# Patient Record
Sex: FEMALE | Race: BLACK OR AFRICAN AMERICAN | NOT HISPANIC OR LATINO | Employment: FULL TIME | URBAN - METROPOLITAN AREA
[De-identification: names, ages, dates, MRNs, and addresses within clinical notes are randomized per-mention and may not be internally consistent; named-entity substitution may affect disease eponyms.]

---

## 2018-06-05 ENCOUNTER — TELEPHONE (OUTPATIENT)
Dept: OBSTETRICS AND GYNECOLOGY | Facility: CLINIC | Age: 28
End: 2018-06-05

## 2018-06-05 NOTE — TELEPHONE ENCOUNTER
Returned the pt's call to the clinic. Informed the pt that Dr. Arboleda isn't currently taking any new ob's and that a message will be sent to Dr. Arboleda to inquire if she can see the pt for her pregnancy. Pt informed that she will be contacted once Dr. Arboleda responds. Pt verbalized understanding.      Can you see this patient for her new pregnancy?

## 2018-06-05 NOTE — TELEPHONE ENCOUNTER
----- Message from Anita Bautista sent at 6/5/2018 11:57 AM CDT -----  Contact: self   Pt is needing a new pregnancy appt. She does have an appt on 7/12/18 for a WWE. The pt is trying to see if she can be seen sooner she is having some cramping. She is about 5wks LMP: April 29. PPT:May 31.

## 2018-06-07 NOTE — TELEPHONE ENCOUNTER
Please let her know I am not accepting OB patients.  Please set up an appointment with Dr. Pantoja or Dr. Corrales

## 2018-06-08 ENCOUNTER — TELEPHONE (OUTPATIENT)
Dept: OBSTETRICS AND GYNECOLOGY | Facility: CLINIC | Age: 28
End: 2018-06-08

## 2018-06-08 DIAGNOSIS — N91.2 AMENORRHEA: Primary | ICD-10-CM

## 2018-06-08 NOTE — TELEPHONE ENCOUNTER
Pt called back, scheduled her with [facility] for Friday for an ectopic pregnancy. Pt verbalized understanding. Scheduled her with this provider because she wanted an  doctor and Dr. Arboleda is not taking new obs.

## 2018-06-12 ENCOUNTER — TELEPHONE (OUTPATIENT)
Dept: OBSTETRICS AND GYNECOLOGY | Facility: CLINIC | Age: 28
End: 2018-06-12

## 2018-06-12 ENCOUNTER — PATIENT MESSAGE (OUTPATIENT)
Dept: OBSTETRICS AND GYNECOLOGY | Facility: CLINIC | Age: 28
End: 2018-06-12

## 2018-06-12 NOTE — TELEPHONE ENCOUNTER
----- Message from Kelly Oneil sent at 6/12/2018  1:31 PM CDT -----  Contact: partner            Name of Who is Calling: STEFFANY HARRY [[id number]]      What is the request in detail: Grace returning call... Please advise      Can the clinic reply by [facility]: no      What Number to Call Back [facility] not [facility]: [phone]

## 2018-06-12 NOTE — TELEPHONE ENCOUNTER
Called patient after seeing reason for visit on Friday's schedule. No answer on patient's phone, left message for patient to contact the office to schedule appointment today. Unable to reach either of the emergency contacts, message left on VM as well.   If patient calls back, please schedule appointment for today.

## 2018-06-13 ENCOUNTER — ROUTINE PRENATAL (OUTPATIENT)
Dept: OBSTETRICS AND GYNECOLOGY | Facility: CLINIC | Age: 28
End: 2018-06-13
Payer: COMMERCIAL

## 2018-06-13 ENCOUNTER — HOSPITAL ENCOUNTER (OUTPATIENT)
Dept: RADIOLOGY | Facility: OTHER | Age: 28
Discharge: HOME | End: 2018-06-13
Attending: OBSTETRICS & GYNECOLOGY
Payer: COMMERCIAL

## 2018-06-13 DIAGNOSIS — N91.2 AMENORRHEA: Primary | ICD-10-CM

## 2018-06-13 DIAGNOSIS — N91.2 AMENORRHEA: ICD-10-CM

## 2018-06-13 LAB
B-HCG UR QL: POSITIVE
CANDIDA RRNA VAG QL PROBE: NEGATIVE
CTP QC/QA: YES
G VAGINALIS RRNA GENITAL QL PROBE: NEGATIVE
T VAGINALIS RRNA GENITAL QL PROBE: NEGATIVE

## 2018-06-13 PROCEDURE — 76801 OB US < 14 WKS SINGLE FETUS: CPT | Mod: 26,,, | Performed by: INTERNAL MEDICINE

## 2018-06-13 PROCEDURE — 87491 CHLMYD TRACH DNA AMP PROBE: CPT

## 2018-06-13 PROCEDURE — 81025 URINE PREGNANCY TEST: CPT | Mod: S$GLB,,, | Performed by: OBSTETRICS & GYNECOLOGY

## 2018-06-13 PROCEDURE — 88175 CYTOPATH C/V AUTO FLUID REDO: CPT

## 2018-06-13 PROCEDURE — 76801 OB US < 14 WKS SINGLE FETUS: CPT | Mod: TC

## 2018-06-13 PROCEDURE — 76817 TRANSVAGINAL US OBSTETRIC: CPT | Mod: 26,,, | Performed by: INTERNAL MEDICINE

## 2018-06-13 PROCEDURE — 87480 CANDIDA DNA DIR PROBE: CPT

## 2018-06-13 PROCEDURE — 99203 OFFICE O/P NEW LOW 30 MIN: CPT | Mod: S$GLB,,, | Performed by: OBSTETRICS & GYNECOLOGY

## 2018-06-13 PROCEDURE — 87510 GARDNER VAG DNA DIR PROBE: CPT

## 2018-06-13 PROCEDURE — 99999 PR PBB SHADOW E&M-EST. PATIENT-LVL II: CPT | Mod: PBBFAC,,, | Performed by: OBSTETRICS & GYNECOLOGY

## 2018-06-13 RX ORDER — METFORMIN HYDROCHLORIDE 500 MG/1
500 TABLET, EXTENDED RELEASE ORAL
COMMUNITY
End: 2018-06-19 | Stop reason: SDUPTHER

## 2018-06-14 ENCOUNTER — TELEPHONE (OUTPATIENT)
Dept: OBSTETRICS AND GYNECOLOGY | Facility: CLINIC | Age: 28
End: 2018-06-14

## 2018-06-14 DIAGNOSIS — N91.1 SECONDARY PHYSIOLOGIC AMENORRHEA: Primary | ICD-10-CM

## 2018-06-14 NOTE — TELEPHONE ENCOUNTER
Spoke with patient in detail about US results.   US the morning on June 25th with appt to see me that afternoon.   Bleeding & pain precautions given

## 2018-06-15 LAB
C TRACH DNA SPEC QL NAA+PROBE: NOT DETECTED
N GONORRHOEA DNA SPEC QL NAA+PROBE: NOT DETECTED

## 2018-06-19 RX ORDER — METFORMIN HYDROCHLORIDE 500 MG/1
500 TABLET, EXTENDED RELEASE ORAL
Qty: 30 TABLET | Refills: 1 | Status: SHIPPED | OUTPATIENT
Start: 2018-06-19 | End: 2018-10-15

## 2018-06-19 NOTE — TELEPHONE ENCOUNTER
----- Message from Adrián Foster sent at 6/19/2018  2:19 PM CDT -----  Pt says her pharmacy did not received her rx can you call it in to [phone]

## 2018-06-20 ENCOUNTER — TELEPHONE (OUTPATIENT)
Dept: OBSTETRICS AND GYNECOLOGY | Facility: CLINIC | Age: 28
End: 2018-06-20

## 2018-06-25 ENCOUNTER — ROUTINE PRENATAL (OUTPATIENT)
Dept: OBSTETRICS AND GYNECOLOGY | Facility: CLINIC | Age: 28
End: 2018-06-25
Payer: COMMERCIAL

## 2018-06-25 ENCOUNTER — HOSPITAL ENCOUNTER (OUTPATIENT)
Dept: RADIOLOGY | Facility: OTHER | Age: 28
Discharge: HOME | End: 2018-06-25
Attending: OBSTETRICS & GYNECOLOGY
Payer: COMMERCIAL

## 2018-06-25 VITALS — DIASTOLIC BLOOD PRESSURE: 74 MMHG | SYSTOLIC BLOOD PRESSURE: 122 MMHG

## 2018-06-25 DIAGNOSIS — O46.8X1 SUBCHORIONIC HEMATOMA IN FIRST TRIMESTER, FETUS 1 OF MULTIPLE GESTATION: ICD-10-CM

## 2018-06-25 DIAGNOSIS — O41.8X11 SUBCHORIONIC HEMATOMA IN FIRST TRIMESTER, FETUS 1 OF MULTIPLE GESTATION: ICD-10-CM

## 2018-06-25 DIAGNOSIS — N91.1 SECONDARY PHYSIOLOGIC AMENORRHEA: ICD-10-CM

## 2018-06-25 DIAGNOSIS — O34.10 UTERINE FIBROIDS AFFECTING PREGNANCY, ANTEPARTUM: ICD-10-CM

## 2018-06-25 DIAGNOSIS — D25.9 UTERINE FIBROIDS AFFECTING PREGNANCY, ANTEPARTUM: ICD-10-CM

## 2018-06-25 DIAGNOSIS — R73.02 GLUCOSE INTOLERANCE (IMPAIRED GLUCOSE TOLERANCE): ICD-10-CM

## 2018-06-25 DIAGNOSIS — Z34.01 ENCOUNTER FOR SUPERVISION OF NORMAL FIRST PREGNANCY IN FIRST TRIMESTER: Primary | ICD-10-CM

## 2018-06-25 DIAGNOSIS — E28.2 PCOS (POLYCYSTIC OVARIAN SYNDROME): ICD-10-CM

## 2018-06-25 PROCEDURE — 76801 OB US < 14 WKS SINGLE FETUS: CPT | Mod: TC

## 2018-06-25 PROCEDURE — 0502F SUBSEQUENT PRENATAL CARE: CPT | Mod: S$GLB,,, | Performed by: OBSTETRICS & GYNECOLOGY

## 2018-06-25 PROCEDURE — 76801 OB US < 14 WKS SINGLE FETUS: CPT | Mod: 26,,, | Performed by: RADIOLOGY

## 2018-06-25 PROCEDURE — 87086 URINE CULTURE/COLONY COUNT: CPT

## 2018-06-25 PROCEDURE — 76817 TRANSVAGINAL US OBSTETRIC: CPT | Mod: 26,,, | Performed by: RADIOLOGY

## 2018-06-25 PROCEDURE — 99999 PR PBB SHADOW E&M-EST. PATIENT-LVL II: CPT | Mod: PBBFAC,,, | Performed by: OBSTETRICS & GYNECOLOGY

## 2018-06-25 NOTE — PROGRESS NOTES
HISTORY OF PRESENT ILLNESS:    Jennifer Hanson is a 28 y.o. female  No LMP recorded. presents today complaining of amenorrhea.   Seen at outside facility. Pregnancy of unknown location, US today.     Past Medical History:   Diagnosis Date    Abnormal cervical Papanicolaou smear     repeat paps       History reviewed. No pertinent surgical history.    MEDICATIONS AND ALLERGIES:      Current Outpatient Prescriptions:     metFORMIN (GLUCOPHAGE-XR) 500 MG 24 hr tablet, Take 1 tablet (500 mg total) by mouth daily with breakfast., Disp: 30 tablet, Rfl: 1    prenatal 48-iron-folic acid-B6 (CITRANATAL B-CALM, FE GLUC,) 20 mg iron-1 mg -25 mg/25 mg TbSQ, Take 1 tablet by mouth once daily., Disp: 30 tablet, Rfl: 9    Review of patient's allergies indicates:   Allergen Reactions    Pcn [penicillins]        COMPREHENSIVE GYN HISTORY:  PAP History: Denies abnormal Paps.  Infection History: Denies STDs. Denies PID.  Benign History: Denies uterine fibroids. Denies ovarian cysts. Denies endometriosis. Denies other conditions.  Cancer History: Denies cervical cancer. Denies uterine cancer or hyperplasia. Denies ovarian cancer. Denies vulvar cancer or pre-cancer. Denies vaginal cancer or pre-cancer. Denies breast cancer. Denies colon cancer.  Sexual Activity History: Reports currently being sexually active  Menstrual History: Every 28 days, flows for 4 days. Light flow.  Dysmenorrhea History: Denies dysmenorrhea.    ROS:  GENERAL: No fever or chills.  BREASTS: No pain. No lumps. No discharge.  ABDOMEN: No pain. No nausea. No vomiting. No diarrhea. No constipation.  REPRODUCTIVE: No abnormal bleeding.   VULVA: No pain. No lesions. No itching.  VAGINA: No relaxation. No itching. No odor. No discharge. No lesions.  URINARY: No incontinence. No nocturia. No frequency. No dysuria.    PE:  APPEARANCE: Well nourished, well developed, in no acute distress.  AFFECT: WNL, alert and oriented x 3.  ABDOMEN: Soft. No tenderness  or masses. No hepatosplenomegaly. No hernias.  BREASTS: Symmetrical, no skin changes or visible lesions. No palpable masses, nipple discharge bilaterally.  PELVIC: Normal external female genitalia without lesions. Normal hair distribution. Adequate perineal body, normal urethral meatus. Vagina pink and well rugated without lesions or discharge. Cervix pink without lesions, discharge or tenderness. No significant cystocele or rectocele. Bimanual exam shows uterus to be 4-6 weeks size, regular, mobile and nontender. Adnexa without masses or tenderness.    PROCEDURES:    1. Amenorrhea        PLAN:    Orders Placed This Encounter    C. trachomatis/N. gonorrhoeae by AMP DNA Cervix    Vaginosis Screen by DNA Probe    US Pelvis Comp with Transvag NON-OB (xpd    CBC auto differential    hCG, quantitative    Progesterone    POCT Urine Pregnancy    Post Partum Type and Screen    Liquid-based pap smear, screening       COUNSELING:  The patient was counseled today on:    FOLLOW-UP with me pending test results.  Strict precautions given

## 2018-06-26 ENCOUNTER — LAB VISIT (OUTPATIENT)
Dept: LAB | Facility: OTHER | Age: 28
End: 2018-06-26
Attending: OBSTETRICS & GYNECOLOGY
Payer: COMMERCIAL

## 2018-06-26 DIAGNOSIS — Z34.01 ENCOUNTER FOR SUPERVISION OF NORMAL FIRST PREGNANCY IN FIRST TRIMESTER: ICD-10-CM

## 2018-06-26 LAB
ABO + RH BLD: NORMAL
ANION GAP SERPL CALC-SCNC: 11 MMOL/L
BACTERIA UR CULT: NO GROWTH
BASOPHILS # BLD AUTO: 0.01 K/UL
BASOPHILS NFR BLD: 0.2 %
BLD GP AB SCN CELLS X3 SERPL QL: NORMAL
BUN SERPL-MCNC: 5 MG/DL
CALCIUM SERPL-MCNC: 9.6 MG/DL
CHLORIDE SERPL-SCNC: 103 MMOL/L
CO2 SERPL-SCNC: 22 MMOL/L
CREAT SERPL-MCNC: 0.7 MG/DL
DIFFERENTIAL METHOD: NORMAL
EOSINOPHIL # BLD AUTO: 0 K/UL
EOSINOPHIL NFR BLD: 0.3 %
ERYTHROCYTE [DISTWIDTH] IN BLOOD BY AUTOMATED COUNT: 13.1 %
EST. GFR  (AFRICAN AMERICAN): >60 ML/MIN/1.73 M^2
EST. GFR  (NON AFRICAN AMERICAN): >60 ML/MIN/1.73 M^2
GLUCOSE SERPL-MCNC: 92 MG/DL
HBV SURFACE AG SERPL QL IA: NEGATIVE
HCT VFR BLD AUTO: 37.6 %
HGB BLD-MCNC: 12.3 G/DL
HIV 1+2 AB+HIV1 P24 AG SERPL QL IA: NEGATIVE
LYMPHOCYTES # BLD AUTO: 1.9 K/UL
LYMPHOCYTES NFR BLD: 32.5 %
MCH RBC QN AUTO: 27.3 PG
MCHC RBC AUTO-ENTMCNC: 32.7 G/DL
MCV RBC AUTO: 84 FL
MONOCYTES # BLD AUTO: 0.5 K/UL
MONOCYTES NFR BLD: 7.8 %
NEUTROPHILS # BLD AUTO: 3.5 K/UL
NEUTROPHILS NFR BLD: 59 %
PLATELET # BLD AUTO: 322 K/UL
PMV BLD AUTO: 9.9 FL
POTASSIUM SERPL-SCNC: 4 MMOL/L
RBC # BLD AUTO: 4.5 M/UL
RPR SER QL: NORMAL
SODIUM SERPL-SCNC: 136 MMOL/L
WBC # BLD AUTO: 5.93 K/UL

## 2018-06-26 PROCEDURE — 36415 COLL VENOUS BLD VENIPUNCTURE: CPT

## 2018-06-26 PROCEDURE — 86592 SYPHILIS TEST NON-TREP QUAL: CPT

## 2018-06-26 PROCEDURE — 87340 HEPATITIS B SURFACE AG IA: CPT

## 2018-06-26 PROCEDURE — 86762 RUBELLA ANTIBODY: CPT

## 2018-06-26 PROCEDURE — 80048 BASIC METABOLIC PNL TOTAL CA: CPT

## 2018-06-26 PROCEDURE — 86901 BLOOD TYPING SEROLOGIC RH(D): CPT

## 2018-06-26 PROCEDURE — 85025 COMPLETE CBC W/AUTO DIFF WBC: CPT

## 2018-06-26 PROCEDURE — 86703 HIV-1/HIV-2 1 RESULT ANTBDY: CPT

## 2018-06-27 LAB
RUBV IGG SER-ACNC: 29.1 IU/ML
RUBV IGG SER-IMP: REACTIVE

## 2018-07-25 ENCOUNTER — ROUTINE PRENATAL (OUTPATIENT)
Dept: OBSTETRICS AND GYNECOLOGY | Facility: CLINIC | Age: 28
End: 2018-07-25
Payer: COMMERCIAL

## 2018-07-25 VITALS — WEIGHT: 176.56 LBS | DIASTOLIC BLOOD PRESSURE: 78 MMHG | BODY MASS INDEX: 32.3 KG/M2 | SYSTOLIC BLOOD PRESSURE: 122 MMHG

## 2018-07-25 DIAGNOSIS — E28.2 PCOS (POLYCYSTIC OVARIAN SYNDROME): ICD-10-CM

## 2018-07-25 DIAGNOSIS — O34.10 UTERINE FIBROIDS AFFECTING PREGNANCY, ANTEPARTUM: Primary | ICD-10-CM

## 2018-07-25 DIAGNOSIS — D25.9 UTERINE FIBROIDS AFFECTING PREGNANCY, ANTEPARTUM: Primary | ICD-10-CM

## 2018-07-25 PROCEDURE — 99999 PR PBB SHADOW E&M-EST. PATIENT-LVL II: CPT | Mod: PBBFAC,,, | Performed by: OBSTETRICS & GYNECOLOGY

## 2018-07-25 PROCEDURE — 0502F SUBSEQUENT PRENATAL CARE: CPT | Mod: S$GLB,,, | Performed by: OBSTETRICS & GYNECOLOGY

## 2018-08-01 ENCOUNTER — LAB VISIT (OUTPATIENT)
Dept: LAB | Facility: OTHER | Age: 28
End: 2018-08-01
Attending: OBSTETRICS & GYNECOLOGY
Payer: COMMERCIAL

## 2018-08-01 ENCOUNTER — OFFICE VISIT (OUTPATIENT)
Dept: MATERNAL FETAL MEDICINE | Facility: CLINIC | Age: 28
End: 2018-08-01
Payer: COMMERCIAL

## 2018-08-01 VITALS — BODY MASS INDEX: 31.13 KG/M2 | WEIGHT: 170.19 LBS

## 2018-08-01 DIAGNOSIS — Z36.82 ENCOUNTER FOR ANTENATAL SCREENING FOR NUCHAL TRANSLUCENCY: ICD-10-CM

## 2018-08-01 DIAGNOSIS — Z34.01 ENCOUNTER FOR SUPERVISION OF NORMAL FIRST PREGNANCY IN FIRST TRIMESTER: ICD-10-CM

## 2018-08-01 DIAGNOSIS — Z36.89 ENCOUNTER FOR FETAL ANATOMIC SURVEY: Primary | ICD-10-CM

## 2018-08-01 PROCEDURE — 99499 UNLISTED E&M SERVICE: CPT | Mod: S$GLB,,, | Performed by: PEDIATRICS

## 2018-08-01 PROCEDURE — 36415 COLL VENOUS BLD VENIPUNCTURE: CPT

## 2018-08-01 PROCEDURE — 76801 OB US < 14 WKS SINGLE FETUS: CPT | Mod: S$GLB,,, | Performed by: PEDIATRICS

## 2018-08-01 PROCEDURE — 99999 PR PBB SHADOW E&M-EST. PATIENT-LVL II: CPT | Mod: PBBFAC,,,

## 2018-08-01 PROCEDURE — 81508 FTL CGEN ABNOR TWO PROTEINS: CPT

## 2018-08-01 PROCEDURE — 76813 OB US NUCHAL MEAS 1 GEST: CPT | Mod: S$GLB,,, | Performed by: PEDIATRICS

## 2018-08-01 NOTE — PROGRESS NOTES
"Indication  ========    First trimester screening.    Method  ======    Transabdominal ultrasound examination, Voluson E10. View: Good view.    Pregnancy  =========    Brunner pregnancy. Number of fetuses: 1.    Dating  ======    LMP on: 4/20/2018  GA by LMP 14 w + 5 d  ASMITA by LMP: 1/25/2019  "Stated Dating" on: 6/25/2018  U/S measurement at external assessment date 9.0 mm  GA at "stated dating" date 7 w + 0 d  GA by "stated dating" 12 w + 2 d  ASMITA by "stated dating": 2/11/2019  "[facility]" [facility]  Ultrasound examination on: 8/1/2018  GA by U/S based upon: CRL  GA by U/S 12 w + 6 d  ASMITA by U/S: 2/7/2019  Assigned: Dating performed on 08/1/2018, based on the external assessment (on 06/25/2018 by [facility])  Assigned GA 12 w + 2 d  Assigned ASMITA: 2/11/2019    General Evaluation  ==============    Cardiac activity: present.    Fetal Biometry  ============    CRL 65.8 mm 12w 6d Hadlock  NT 1.0 mm   bpm    Fetal Anatomy  ============    The following structures appear normal:  Cranium. Abdominal wall.    The following structures were visualized:  Arms. Legs.            Impression  =========    Fetal size is consistent with established dating, and normal fetal heart motion is seen.    The nuchal translucency is measured WNL at 1.0 mm and nasal bone is seen.    A sample of maternal blood will be sent today for the first portion of the sequential screen.              Recommendation  ==============    First portion of sequential screening completed today. Results to be sent to primary pregnancy care provider. Recommend to complete second  blood draw beyond 15 weeks EGA of pregnancy. Ultrasound for fetal anatomical survey scheduled by MFM today for 19-20 weeks EGA.    Thank you again for allowing us to participate in the care of your patients. If you have any questions concerning today's consultation, feel free  to contact me or one of my partners. We can be reached at [phone] during " normal business hours. If you have a question after normal  business hours, please contact Labor and Delivery at [phone].

## 2018-08-03 LAB
# FETUSES US: NORMAL
AGE AT DELIVERY: 29
B-HCG MOM SERPL: NORMAL
B-HCG SERPL-ACNC: 166.9 IU/ML
FET CRL US.MEAS: 65.8 MM
FET NASAL BONE LENGTH US.MEAS: NORMAL MM
FET NUCHAL FOLD MOM THICKNESS US.MEAS: NORMAL
FET NUCHAL FOLD THICKNESS US.MEAS: 1 MM
FET TS 21 RISK FROM MAT AGE: NORMAL
GA (DAYS): 6 D
GA (WEEKS): 12 WK
IDDM PATIENT QL: NORMAL
INTEGRATED SCN PATIENT-IMP: NEGATIVE
PAPP-A MOM SERPL: NORMAL
PAPP-A SERPL-MCNC: NORMAL NG/ML
SEQUENTIAL SCREEN I INTERP.: NORMAL
SMOKING STATUS FTND: NO
TS 18 RISK FETUS: NORMAL
TS 21 RISK FETUS: NORMAL
US DATE: NORMAL

## 2018-08-06 ENCOUNTER — HOSPITAL ENCOUNTER (OUTPATIENT)
Dept: RADIOLOGY | Facility: OTHER | Age: 28
Discharge: HOME | End: 2018-08-06
Attending: OBSTETRICS & GYNECOLOGY
Payer: COMMERCIAL

## 2018-08-06 DIAGNOSIS — N91.2 AMENORRHEA: ICD-10-CM

## 2018-08-23 ENCOUNTER — TELEPHONE (OUTPATIENT)
Dept: OBSTETRICS AND GYNECOLOGY | Facility: CLINIC | Age: 28
End: 2018-08-23

## 2018-08-23 NOTE — TELEPHONE ENCOUNTER
----- Message from Lani Curtis sent at 8/23/2018  2:56 PM CDT -----  Contact: Pt  Name of Who is Calling: STEFFANY HARRY [[id number]]      What is the request in detail: Patient 15 weeks Ob is requesting to speak with the staff in regards to stomach pains she is experiencing.....Please contact to further discuss and advise       Can the clinic reply by [facility]: No      What Number to [facility] Back [facility]: [phone]

## 2018-08-29 ENCOUNTER — ROUTINE PRENATAL (OUTPATIENT)
Dept: OBSTETRICS AND GYNECOLOGY | Facility: CLINIC | Age: 28
End: 2018-08-29
Attending: OBSTETRICS & GYNECOLOGY
Payer: COMMERCIAL

## 2018-08-29 VITALS
BODY MASS INDEX: 31.45 KG/M2 | SYSTOLIC BLOOD PRESSURE: 110 MMHG | DIASTOLIC BLOOD PRESSURE: 60 MMHG | WEIGHT: 171.94 LBS

## 2018-08-29 DIAGNOSIS — O34.10 UTERINE FIBROIDS AFFECTING PREGNANCY, ANTEPARTUM: Primary | ICD-10-CM

## 2018-08-29 DIAGNOSIS — D25.9 UTERINE FIBROIDS AFFECTING PREGNANCY, ANTEPARTUM: Primary | ICD-10-CM

## 2018-08-29 DIAGNOSIS — Z34.02 PREGNANCY, FIRST, SECOND TRIMESTER: ICD-10-CM

## 2018-08-29 PROCEDURE — 99999 PR PBB SHADOW E&M-EST. PATIENT-LVL II: CPT | Mod: PBBFAC,,, | Performed by: OBSTETRICS & GYNECOLOGY

## 2018-08-29 PROCEDURE — 0502F SUBSEQUENT PRENATAL CARE: CPT | Mod: S$GLB,,, | Performed by: OBSTETRICS & GYNECOLOGY

## 2018-08-29 NOTE — PROGRESS NOTES
Patient has some cramping/RLP last week, now resolved. Patient considering delivery in the [facility], will discuss next visit. Patient reports no vaginal bleeding, contractions or rupture of membranes. Overall doing well. Labs reviewed. Questions answered today.

## 2018-09-04 ENCOUNTER — LAB VISIT (OUTPATIENT)
Dept: LAB | Facility: OTHER | Age: 28
End: 2018-09-04
Attending: OBSTETRICS & GYNECOLOGY
Payer: COMMERCIAL

## 2018-09-04 DIAGNOSIS — Z34.02 PREGNANCY, FIRST, SECOND TRIMESTER: ICD-10-CM

## 2018-09-04 PROCEDURE — 36415 COLL VENOUS BLD VENIPUNCTURE: CPT

## 2018-09-04 PROCEDURE — 81511 FTL CGEN ABNOR FOUR ANAL: CPT

## 2018-09-06 LAB
# FETUSES US: NORMAL
AFP MOM SERPL: 1.25
AFP SERPL-MCNC: 45.1 NG/ML
AGE AT DELIVERY: 29
B-HCG MOM SERPL: 2.15
B-HCG SERPL-ACNC: 49.8 IU/ML
COLLECT DATE BLD: NORMAL
COLLECT DATE: NORMAL
FET NASAL BONE LENGTH US.MEAS: NORMAL MM
FET NUCHAL FOLD MOM THICKNESS US.MEAS: 0.66
FET NUCHAL FOLD THICKNESS US.MEAS: 1 MM
FET TS 21 RISK FROM MAT AGE: NORMAL
GA (DAYS): 6 D
GA (WEEKS): 17 WK
GA METHOD: NORMAL
GEST. AGE (DAYS) 2ND SAMPLE (SS2): 5
GEST. AGE (WKS) 1ST SAMPLE (SS2): 12
IDDM PATIENT QL: NORMAL
INHIBIN A MOM SERPL: 0.7
INHIBIN A SERPL-MCNC: 116.4 PG/ML
INTEGRATED SCN PATIENT-IMP: NEGATIVE
PAPP-A MOM SERPL: 1.07
PAPP-A SERPL-MCNC: NORMAL NG/ML
SEQUENTIAL SCREEN PART 2 INTERP: NORMAL
TS 18 RISK FETUS: NORMAL
TS 21 RISK FETUS: NORMAL
U ESTRIOL MOM SERPL: 1.3
U ESTRIOL SERPL-MCNC: 1.45 NG/ML

## 2018-09-17 ENCOUNTER — PROCEDURE VISIT (OUTPATIENT)
Dept: MATERNAL FETAL MEDICINE | Facility: CLINIC | Age: 28
End: 2018-09-17
Payer: COMMERCIAL

## 2018-09-17 DIAGNOSIS — N83.209 OVARIAN CYST DURING PREGNANCY IN SECOND TRIMESTER: ICD-10-CM

## 2018-09-17 DIAGNOSIS — Z36.89 ENCOUNTER FOR FETAL ANATOMIC SURVEY: ICD-10-CM

## 2018-09-17 DIAGNOSIS — D25.9 UTERINE FIBROIDS AFFECTING PREGNANCY, ANTEPARTUM: ICD-10-CM

## 2018-09-17 DIAGNOSIS — Z36.89 ENCOUNTER FOR ULTRASOUND TO CHECK FETAL GROWTH: Primary | ICD-10-CM

## 2018-09-17 DIAGNOSIS — O34.10 UTERINE FIBROIDS AFFECTING PREGNANCY, ANTEPARTUM: ICD-10-CM

## 2018-09-17 DIAGNOSIS — O34.82 OVARIAN CYST DURING PREGNANCY IN SECOND TRIMESTER: ICD-10-CM

## 2018-09-17 DIAGNOSIS — Z33.1 PREGNANCY, NORMAL, INCIDENTAL: ICD-10-CM

## 2018-09-17 PROCEDURE — 99499 UNLISTED E&M SERVICE: CPT | Mod: S$GLB,,, | Performed by: OBSTETRICS & GYNECOLOGY

## 2018-09-17 PROCEDURE — 76805 OB US >/= 14 WKS SNGL FETUS: CPT | Mod: S$GLB,,, | Performed by: OBSTETRICS & GYNECOLOGY

## 2018-09-19 ENCOUNTER — ROUTINE PRENATAL (OUTPATIENT)
Dept: OBSTETRICS AND GYNECOLOGY | Facility: CLINIC | Age: 28
End: 2018-09-19
Payer: COMMERCIAL

## 2018-09-19 ENCOUNTER — OFFICE VISIT (OUTPATIENT)
Dept: OPTOMETRY | Facility: CLINIC | Age: 28
End: 2018-09-19
Payer: COMMERCIAL

## 2018-09-19 VITALS
SYSTOLIC BLOOD PRESSURE: 112 MMHG | DIASTOLIC BLOOD PRESSURE: 66 MMHG | WEIGHT: 176.13 LBS | BODY MASS INDEX: 32.22 KG/M2

## 2018-09-19 DIAGNOSIS — Z46.0 FITTING AND ADJUSTMENT OF SPECTACLES AND CONTACT LENSES: Primary | ICD-10-CM

## 2018-09-19 DIAGNOSIS — Z34.02 ENCOUNTER FOR SUPERVISION OF NORMAL FIRST PREGNANCY IN SECOND TRIMESTER: Primary | ICD-10-CM

## 2018-09-19 DIAGNOSIS — H52.13 MYOPIA OF BOTH EYES: Primary | ICD-10-CM

## 2018-09-19 PROCEDURE — 92015 DETERMINE REFRACTIVE STATE: CPT | Mod: S$GLB,,, | Performed by: OPTOMETRIST

## 2018-09-19 PROCEDURE — 92004 COMPRE OPH EXAM NEW PT 1/>: CPT | Mod: S$GLB,,, | Performed by: OPTOMETRIST

## 2018-09-19 PROCEDURE — 99999 PR PBB SHADOW E&M-EST. PATIENT-LVL II: CPT | Mod: PBBFAC,,, | Performed by: OBSTETRICS & GYNECOLOGY

## 2018-09-19 PROCEDURE — 92310 CONTACT LENS FITTING OU: CPT | Mod: S$GLB,,, | Performed by: OPTOMETRIST

## 2018-09-19 PROCEDURE — 99999 PR PBB SHADOW E&M-EST. PATIENT-LVL II: CPT | Mod: PBBFAC,,, | Performed by: OPTOMETRIST

## 2018-09-19 PROCEDURE — 99999 PR PBB SHADOW E&M-EST. PATIENT-LVL I: CPT | Mod: PBBFAC,,, | Performed by: OPTOMETRIST

## 2018-09-19 PROCEDURE — 0502F SUBSEQUENT PRENATAL CARE: CPT | Mod: S$GLB,,, | Performed by: OBSTETRICS & GYNECOLOGY

## 2018-09-19 NOTE — PROGRESS NOTES
Patient reports good fm, no vaginal bleeding, contractions or rupture of membranes. Overall doing well. Labs reviewed. Questions answerUS done - repeat scheduled. ed today. Encouraged prenatal classes and lactation visits. Labor, ROM and VB precautions given.

## 2018-09-28 ENCOUNTER — TELEPHONE (OUTPATIENT)
Dept: OBSTETRICS AND GYNECOLOGY | Facility: CLINIC | Age: 28
End: 2018-09-28

## 2018-09-28 NOTE — TELEPHONE ENCOUNTER
Pt interested in waterbirth and transferring care. Neg hx, uncomplicated pregnancy. appt made for 10/15.

## 2018-10-10 ENCOUNTER — PATIENT MESSAGE (OUTPATIENT)
Dept: OBSTETRICS AND GYNECOLOGY | Facility: CLINIC | Age: 28
End: 2018-10-10

## 2018-10-15 ENCOUNTER — PROCEDURE VISIT (OUTPATIENT)
Dept: MATERNAL FETAL MEDICINE | Facility: CLINIC | Age: 28
End: 2018-10-15
Payer: MEDICAID

## 2018-10-15 ENCOUNTER — INITIAL CONSULT (OUTPATIENT)
Dept: MATERNAL FETAL MEDICINE | Facility: CLINIC | Age: 28
End: 2018-10-15
Payer: MEDICAID

## 2018-10-15 ENCOUNTER — INITIAL PRENATAL (OUTPATIENT)
Dept: OBSTETRICS AND GYNECOLOGY | Facility: CLINIC | Age: 28
End: 2018-10-15
Payer: MEDICAID

## 2018-10-15 VITALS — WEIGHT: 178.25 LBS | BODY MASS INDEX: 32.6 KG/M2 | SYSTOLIC BLOOD PRESSURE: 102 MMHG | DIASTOLIC BLOOD PRESSURE: 58 MMHG

## 2018-10-15 DIAGNOSIS — N83.201 RIGHT OVARIAN CYST: ICD-10-CM

## 2018-10-15 DIAGNOSIS — D25.9 UTERINE FIBROIDS AFFECTING PREGNANCY, ANTEPARTUM: ICD-10-CM

## 2018-10-15 DIAGNOSIS — O35.9XX0 FETAL ABNORMALITY AFFECTING MANAGEMENT OF MOTHER, SINGLE OR UNSPECIFIED FETUS: ICD-10-CM

## 2018-10-15 DIAGNOSIS — Z34.92 PRENATAL CARE IN SECOND TRIMESTER: ICD-10-CM

## 2018-10-15 DIAGNOSIS — Z36.89 ENCOUNTER FOR ULTRASOUND TO CHECK FETAL GROWTH: ICD-10-CM

## 2018-10-15 DIAGNOSIS — O34.10 UTERINE FIBROIDS AFFECTING PREGNANCY, ANTEPARTUM: ICD-10-CM

## 2018-10-15 PROBLEM — R73.02 GLUCOSE INTOLERANCE (IMPAIRED GLUCOSE TOLERANCE): Status: RESOLVED | Noted: 2018-06-25 | Resolved: 2018-10-15

## 2018-10-15 PROCEDURE — 99212 OFFICE O/P EST SF 10 MIN: CPT | Mod: PBBFAC,TH | Performed by: ADVANCED PRACTICE MIDWIFE

## 2018-10-15 PROCEDURE — 99999 PR PBB SHADOW E&M-EST. PATIENT-LVL II: CPT | Mod: PBBFAC,,, | Performed by: ADVANCED PRACTICE MIDWIFE

## 2018-10-15 PROCEDURE — 76816 OB US FOLLOW-UP PER FETUS: CPT | Mod: 26,S$PBB,, | Performed by: OBSTETRICS & GYNECOLOGY

## 2018-10-15 PROCEDURE — 99212 OFFICE O/P EST SF 10 MIN: CPT | Mod: TH,S$PBB,, | Performed by: ADVANCED PRACTICE MIDWIFE

## 2018-10-15 PROCEDURE — 99212 OFFICE O/P EST SF 10 MIN: CPT | Mod: S$PBB,TH,25, | Performed by: OBSTETRICS & GYNECOLOGY

## 2018-10-15 PROCEDURE — 76816 OB US FOLLOW-UP PER FETUS: CPT | Mod: PBBFAC | Performed by: OBSTETRICS & GYNECOLOGY

## 2018-10-15 NOTE — LETTER
October 15, 2018      Rosa Wheat, LUIS  [address] [address]           [facility]  [address] [address]  Phone: [phone]          Patient: Jennifer Hanson   MR Number: [id number]   YOB: 1990   Date of Visit: 10/15/2018       Dear Rosa Wheat:    Thank you for referring Jennifer Hanson to me for evaluation. Attached you will find relevant portions of my assessment and plan of care.    If you have questions, please do not hesitate to call me. I look forward to following Jennifer Hanson along with you.    Sincerely,    Lani Reich MD    Enclosure  CC:  No Recipients    If you would like to receive this communication electronically, please contact [email or web address] or [phone] to request more information on [facility] Link access.    For providers and/or their staff who would like to refer a patient to [facility], please contact us through our one-stop-shop provider referral line, [facility], at [phone].    If you feel you have received this communication in error or would no longer like to receive these types of communications, please e-mail [email or web address]

## 2018-10-16 ENCOUNTER — TELEPHONE (OUTPATIENT)
Dept: OBSTETRICS AND GYNECOLOGY | Facility: CLINIC | Age: 28
End: 2018-10-16

## 2018-10-16 PROBLEM — Q30.8: Status: ACTIVE | Noted: 2018-10-16

## 2018-10-16 NOTE — PROGRESS NOTES
"Jennifer Hanson is a 28 y.o. , presents today for a transfer of prenatal care within the [facility] system    C/C: transfer of prenatal care    Dating via 7wk US    Reports feeling FM, denies VB, LOF or cramping.     SOCIAL HISTORY: Denies emotional/mental/physical/sexual violence or abuse. Feels safe at home. Unaccompanied today. In relationship with partner, "ZAYNAB" or "Grace" who has son from previous relationship. This will be their first baby together.     Conceived spontaneously with ZAYNAB (transgender MTF) after about 8 cycles using metformin for hx of PCOS.     PAP HISTORY: last pap , result negative, denies hx of STDs or current sx      Review of patient's allergies indicates:   Allergen Reactions    Pcn [penicillins] Other (See Comments)     Uncertain of reaction - just told from her mother that since she was little she is allergic     Past Medical History:   Diagnosis Date    Abnormal cervical Papanicolaou smear     repeat paps    PCOS (polycystic ovarian syndrome)     Metformin late , stopped 2018     Past Surgical History:   Procedure Laterality Date    KNEE SURGERY  2003    WISDOM TOOTH EXTRACTION       Past Surgical History:   Procedure Laterality Date    KNEE SURGERY  2003    WISDOM TOOTH EXTRACTION       OB History    Para Term  AB Living   1 0 0 0 0 0   SAB TAB Ectopic Multiple Live Births   0 0 0 0        # Outcome Date GA Lbr Kenneth/2nd Weight Sex Delivery Anes PTL Lv   1 Current                 OB History      Para Term  AB Living    1 0 0 0 0 0    SAB TAB Ectopic Multiple Live Births    0 0 0 0          Social History     Socioeconomic History    Marital status:      Spouse name: J    Number of children: Not on file    Years of education: Not on file    Highest education level: Not on file   Social Needs    Financial resource strain: Not hard at all    Food insecurity - worry: Never true    Food insecurity - inability: Never true    " "Transportation needs - medical: No    Transportation needs - non-medical: No   Occupational History    Occupation: Teacher   Tobacco Use    Smoking status: Never Smoker    Smokeless tobacco: Never Used   Substance and Sexual Activity    Alcohol use: Yes    Drug use: No     Comment: None with pregnancy    Sexual activity: Yes     Partners: Female     Birth control/protection: None     Comment: Partner J   Other Topics Concern    Not on file   Social History Narrative    Partner "ZAYNAB" / Grace (has other son 5yo Elijah Mcknight)    First baby together        She lives alone         No cats     Family History   Problem Relation Age of Onset    No Known Problems Father     Thyroid disease Mother     No Known Problems Sister     No Known Problems Brother     No Known Problems Maternal Aunt     No Known Problems Maternal Uncle     No Known Problems Paternal Aunt     No Known Problems Paternal Uncle     Cataracts Maternal Grandmother     Hypertension Maternal Grandmother     Stroke Maternal Grandfather     No Known Problems Paternal Grandmother     Lung cancer Paternal Grandfather         Environmental    Breast cancer Neg Hx     Colon cancer Neg Hx     Ovarian cancer Neg Hx     Amblyopia Neg Hx     Blindness Neg Hx     Cancer Neg Hx     Diabetes Neg Hx     Glaucoma Neg Hx     Macular degeneration Neg Hx     Retinal detachment Neg Hx     Strabismus Neg Hx      Social History     Substance and Sexual Activity   Sexual Activity Yes    Partners: Female    Birth control/protection: None    Comment: Partner J       OBJECTIVE: see prenatal flow sheet  BP (!) 102/58   Wt 80.8 kg (178 lb 3.9 oz)   LMP 04/20/2018   BMI 32.60 kg/m²   Constitutional/Gen: NAD, appears stated age, well groomed  Lung: normal resp effort  Extremities: FROM, with no edema or tenderness.  Neurologic: A&O x 4, non-focal, cranial nerves 2-12 grossly intact  Psych: affect appropriate and without signs of mood, thought or " memory difficulty appreciated    ASSESSMENT:  28 y.o. female  at 23w1d for transfer of prenatal care  Body mass index is 32.6 kg/m².  Patient Active Problem List   Diagnosis    PCOS (polycystic ovarian syndrome)    Uterine fibroids affecting pregnancy, antepartum    Glucose intolerance (impaired glucose tolerance)       PLAN:  1. Transfer of prenatal care within [facility]  -- Continue prenatal care    2. BMI 30  -- Discussed IOM recommended weight gain of:   Weight category Pre pre BMI  Recommended TWG   Underweight Less than 18.5 28-40    Normal Weight 18.5-24.9  25-35    Overweight 25-29.9  15-25    Obese   30 and greater  11-20   -- Discussed criteria for delivery at [facility] r/t excessive pre-preg weight or excessive weight gain:   Pre-pregnancy BMI over 40 or excess pregnancy weight gain defined as:   Pre-preg BMI < 18.5; Excess weight gain = > 60 pound   Pre-preg BMI 18.5-24.9;  Excess weight gain = > 53 pounds   Pre-preg BMI 25-29.9;  Excess weight gain = > 38 pounds   Pre-preg BMI > 30;  Excess weight gain = > 30 pounds    3. Oriented to practice and midwifery service  -- Pregnancy education and couseling; handouts and booklet provided  -- Reviewed anticipated prenatal course of care and how to contact us  -- Reviewed [facility] guidelines and admission, exclusion, and transfer criteria  -- Precautions/warning signs reviewed  -- Common complaints of pregnancy  -- Routine prenatal labs including HIV  -- Ultrasounds  -- Childbirth education/hospital/[facility] facilities  -- Nutrition, prepregnant BMI, and recommended weight gain  -- Toxoplasmosis precautions (Cats/Raw Meat)  -- Sexual activity and exercise  -- Environmental/Work hazards  -- Travel  -- Tobacco (Ask, Advise, Assess, Assist, and Arrange), as well as alcohol and drug use  -- Use of any medications (Including supplements, Vitamins, Herbs, or OTC Drugs)  -- Domestic violence screen negative    4. Reviewed neg genetic screening    5. Uterine  fibroids  -- Aware, problem list updated, has f/u US with MFM scheduled    6. R ovarian cyst  -- Aware, problem list updated, has f/u US with MFM scheduled    7. Reviewed warning signs, precautions, and how/when to contact us.     8. RTC x 4 weeks, call or present sooner prn. Upcoming 28 wk labs reviewed, orders placed, Rh POS    Updated Medication List:  Current Outpatient Medications   Medication Sig Dispense Refill    docosahexanoic acid (DHA PRENATAL ORAL) Take by mouth.      prenatal 48-iron-folic acid-B6 (CITRANATAL B-CALM, FE GLUC,) 20 mg iron-1 mg -25 mg/25 mg TbSQ Take 1 tablet by mouth once daily. 30 tablet 9     No current facility-administered medications for this visit.          Rosa Sears CNM/NP  10/15/2018 7:28 PM

## 2018-10-16 NOTE — PROGRESS NOTES
"Indication  ========    Follow-up evaluation of anatomy.    Pregnancy History  ==============    Maternal Lab Tests  Test: Sequential Screen  Date: 9/4/2018  Result: negative: DSR 1:40,000 Trisomy 18 1:40,000 negative for ONTD  Wants to know gender: no    Method  ======    Transabdominal ultrasound examination, Voluson E10. View: Good view.    Pregnancy  =========    Brunner pregnancy. Number of fetuses: 1.    Dating  ======    LMP on: 4/20/2018  Cycle: regular cycle  GA by LMP 25 w + 3 d  ASMITA by LMP: 1/25/2019  "Stated Dating" on: 6/25/2018  U/S measurement at external assessment date 9.0 mm  GA at "stated dating" date 7 w + 0 d  GA by "stated dating" 23 w + 0 d  ASMITA by "stated dating": 2/11/2019  "St[facility] dating" [facility]  Ultrasound examination on: 10/15/2018  GA by U/S based upon: AC, BPD, Femur, HC  GA by U/S 23 w + 5 d  ASMITA by U/S: 2/6/2019  Assigned: Dating performed on 08/1/2018, based on the external assessment ([facility])  Assigned GA 23 w + 0 d  Assigned ASMITA: 2/11/2019    General Evaluation  ==============    Cardiac activity: present.  bpm.  Fetal movements: visualized.  Presentation: breech.  Placenta:  Placental site: posterior.  Amniotic fluid: Amount of AF: normal amount. MVP 5.4 cm.    Fetal Biometry  ============    Fetal Biometry  BPD 58.0 mm 23w 5d Hadlock  OFD 74.6 mm 24w 5d Nuvia  .6 mm 23w 3d Hadlock  .5 mm 23w 6d Hadlock  Femur 42.3 mm 23w 6d Hadlock   g 57% David  Calculated by: Hadlock (BPD-HC-AC-FL)  EFW (lb) 1 lb  EFW (oz) 6 oz  Cephalic index 0.78  HC / AC 1.12  FL / BPD 0.73  FL / AC 0.22  MVP 5.4 cm   bpm    Fetal Anatomy  ===========    Cranium: normal  Cavum septi pellucidi: normal  Lips: normal  Profile: abnormal  Nose: normal  Profile: absent nasal bone  4-chamber view: normal  RVOT: normal  LVOT: normal  Stomach: normal  Kidneys: normal  Bladder: normal  Cervical spine: normal  Sacral spine: normal  Lt " arm: normal  Position of hands: normal  Wants to know gender: no    Maternal Structures  ===============    Uterus / Cervix  Fibroids: Fibroids identified  Findings: Subserous. Posterior RT Fundus  D1 42.9 mm  D2 59.8 mm  D3 29.6 mm  Mean 44.1 mm  Vol 39.750 cm³  Findings: Intramural. Anterior RT  D1 36.9 mm  D2 12.4 mm  D3 28.5 mm  Mean 25.9 mm  Vol 6.824 cm³  Findings: Intramural. Anterior RT  D1 21.2 mm  D2 8.5 mm  D3 16.2 mm  Mean 15.3 mm  Vol 1.526 cm³  Ovaries / Tubes / Adnexa  Rt ovary: Abnormal  Rt ovarian cyst(s): Cysts identified  Findings: Simple cyst  D1 48.1 mm  D2 46.3 mm  D3 38.9 mm  Mean 44.4 mm  Vol 45.360 cm³    Consultation  ==========    Consultation for absent nasal bone.  Denies any significant PMH.  G1.  Down syndrome screening results reviewed: 1:75387; Age related risk 1:790.  We discussed the findings from today's ultrasound; specifically the association between an absent/hypoplastic nasal bone and Down  syndrome. No other malformations are noted but the patient was counseled on the limitations of prenatal ultrasound in diagnosing Down  syndrome. We discussed that absent nasal bone/hypoplastic nasal bone can be a normal variation in women of -American descent. We  reviewed the results of her aneuploidy screening. If a likelihood ratio of 20 was utilized to adjust for the absence of the nasal bone, the adjusted  risk is 1:2000 which remains quite low. However, genetic amniocentesis as a means for diagnostic purposes was reviewed. She currently  declines but may reconsider.  Time  I overall spent approximately 10 minutes in face to face time with the patient, greater than 50% of which was in counseling and care  coordination.    Impression  =========    A follow-up fetal ultrasound was performed today.  No major structural abnormalities were noted in the structures visualized.  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  Fetal biometry measures consistent with EDC.  The AFV is subjectively  normal.  Simple cyst on right ovary.  Uterine fibroids as above.    Recommendation  ==============    Recommend follow-up fetal growth ultrasounds at 28 and 34 weeks.  Amniocentesis remains an option, should the patient desire this.

## 2018-10-16 NOTE — TELEPHONE ENCOUNTER
Spoke with patient re: US yesterday, absent nasal bone  She continues to decline amnio or additional f/u    Rosa Sears CNM/NP  Certified Nurse Midwife/Nurse Practitioner  10/16/2018 8:16 AM

## 2018-10-16 NOTE — TELEPHONE ENCOUNTER
Left another voicemail  Rosa Sears CNM/NP  Certified Nurse Midwife/Nurse Practitioner  10/16/2018 8:11 AM      ----- Message from Nia Sainz MA sent at 10/16/2018  8:08 AM CDT -----  Regarding: Patient call back  Saturnino Lee! Mrs. Rodriguez states she was returning your call regarding her ultrasound. Please call her at the number on file.    Thanks!  Felipe

## 2018-10-16 NOTE — TELEPHONE ENCOUNTER
Left voicemail - going to f/u on f/u anatomy US from yesterday -- see Rachana report/consult  Appears to be absent nasal bone  Sequential screening was negative  Patient declined amnio yesterday    Wanted to personally f/u    Rosa Sears CNM/NP  Certified Nurse Midwife/Nurse Practitioner  10/16/2018 7:55 AM

## 2018-11-12 ENCOUNTER — ROUTINE PRENATAL (OUTPATIENT)
Dept: OBSTETRICS AND GYNECOLOGY | Facility: CLINIC | Age: 28
End: 2018-11-12
Payer: MEDICAID

## 2018-11-12 ENCOUNTER — LAB VISIT (OUTPATIENT)
Dept: LAB | Facility: OTHER | Age: 28
End: 2018-11-12
Payer: MEDICAID

## 2018-11-12 VITALS — WEIGHT: 178.25 LBS | DIASTOLIC BLOOD PRESSURE: 66 MMHG | SYSTOLIC BLOOD PRESSURE: 108 MMHG | BODY MASS INDEX: 32.6 KG/M2

## 2018-11-12 DIAGNOSIS — Z34.92 PRENATAL CARE IN SECOND TRIMESTER: Primary | ICD-10-CM

## 2018-11-12 DIAGNOSIS — Z34.92 PRENATAL CARE IN SECOND TRIMESTER: ICD-10-CM

## 2018-11-12 DIAGNOSIS — D64.9 ANEMIA, UNSPECIFIED TYPE: ICD-10-CM

## 2018-11-12 LAB
BASOPHILS # BLD AUTO: 0.01 K/UL
BASOPHILS NFR BLD: 0.1 %
DIFFERENTIAL METHOD: ABNORMAL
EOSINOPHIL # BLD AUTO: 0 K/UL
EOSINOPHIL NFR BLD: 0.3 %
ERYTHROCYTE [DISTWIDTH] IN BLOOD BY AUTOMATED COUNT: 13.1 %
GLUCOSE SERPL-MCNC: 115 MG/DL
HCT VFR BLD AUTO: 33.2 %
HGB BLD-MCNC: 10.7 G/DL
LYMPHOCYTES # BLD AUTO: 1.5 K/UL
LYMPHOCYTES NFR BLD: 19.1 %
MCH RBC QN AUTO: 27.9 PG
MCHC RBC AUTO-ENTMCNC: 32.2 G/DL
MCV RBC AUTO: 87 FL
MONOCYTES # BLD AUTO: 0.5 K/UL
MONOCYTES NFR BLD: 6.2 %
NEUTROPHILS # BLD AUTO: 5.8 K/UL
NEUTROPHILS NFR BLD: 73.9 %
PLATELET # BLD AUTO: 339 K/UL
PMV BLD AUTO: 10.4 FL
RBC # BLD AUTO: 3.83 M/UL
WBC # BLD AUTO: 7.87 K/UL

## 2018-11-12 PROCEDURE — 36415 COLL VENOUS BLD VENIPUNCTURE: CPT

## 2018-11-12 PROCEDURE — 99999 PR PBB SHADOW E&M-EST. PATIENT-LVL III: CPT | Mod: PBBFAC,,, | Performed by: ADVANCED PRACTICE MIDWIFE

## 2018-11-12 PROCEDURE — 85025 COMPLETE CBC W/AUTO DIFF WBC: CPT

## 2018-11-12 PROCEDURE — 99212 OFFICE O/P EST SF 10 MIN: CPT | Mod: TH,S$PBB,, | Performed by: ADVANCED PRACTICE MIDWIFE

## 2018-11-12 PROCEDURE — 83021 HEMOGLOBIN CHROMOTOGRAPHY: CPT

## 2018-11-12 PROCEDURE — 82950 GLUCOSE TEST: CPT

## 2018-11-12 PROCEDURE — 99213 OFFICE O/P EST LOW 20 MIN: CPT | Mod: PBBFAC | Performed by: ADVANCED PRACTICE MIDWIFE

## 2018-11-12 RX ORDER — FERROUS SULFATE 325(65) MG
325 TABLET ORAL DAILY
Qty: 30 TABLET | Refills: 3 | Status: SHIPPED | OUTPATIENT
Start: 2018-11-12 | End: 2019-11-12

## 2018-11-12 NOTE — PROGRESS NOTES
28 y.o. 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  RTC x 2 wks, call or present sooner prn.     Birth Center Risk Assessment: 0  0- CNM management in ABC  1- CNM management on L&D  2- Consultation with OB to develop plan of care  3- Collaborative CNM/OB management with delivery on L&D  4- Referral or transfer of care to MD

## 2018-11-14 LAB
HGB A2 MFR BLD HPLC: 3 %
HGB FRACT BLD ELPH-IMP: NORMAL
HGB FRACT BLD ELPH-IMP: NORMAL

## 2018-11-20 ENCOUNTER — PROCEDURE VISIT (OUTPATIENT)
Dept: MATERNAL FETAL MEDICINE | Facility: CLINIC | Age: 28
End: 2018-11-20
Attending: OBSTETRICS & GYNECOLOGY
Payer: MEDICAID

## 2018-11-20 ENCOUNTER — TELEPHONE (OUTPATIENT)
Dept: OBSTETRICS AND GYNECOLOGY | Facility: HOSPITAL | Age: 28
End: 2018-11-20

## 2018-11-20 DIAGNOSIS — O35.9XX0 FETAL ABNORMALITY AFFECTING MANAGEMENT OF MOTHER, SINGLE OR UNSPECIFIED FETUS: ICD-10-CM

## 2018-11-20 DIAGNOSIS — O34.83: ICD-10-CM

## 2018-11-20 DIAGNOSIS — D25.9 UTERINE FIBROID IN PREGNANCY: ICD-10-CM

## 2018-11-20 DIAGNOSIS — N83.209: ICD-10-CM

## 2018-11-20 DIAGNOSIS — Z36.89 ENCOUNTER FOR ULTRASOUND TO CHECK FETAL GROWTH: Primary | ICD-10-CM

## 2018-11-20 DIAGNOSIS — N91.2 AMENORRHEA: ICD-10-CM

## 2018-11-20 DIAGNOSIS — O34.10 UTERINE FIBROID IN PREGNANCY: ICD-10-CM

## 2018-11-20 PROCEDURE — 99499 UNLISTED E&M SERVICE: CPT | Mod: S$PBB,,, | Performed by: OBSTETRICS & GYNECOLOGY

## 2018-11-20 PROCEDURE — 76816 OB US FOLLOW-UP PER FETUS: CPT | Mod: PBBFAC | Performed by: OBSTETRICS & GYNECOLOGY

## 2018-11-20 PROCEDURE — 76816 OB US FOLLOW-UP PER FETUS: CPT | Mod: 26,S$PBB,, | Performed by: OBSTETRICS & GYNECOLOGY

## 2018-11-20 NOTE — TELEPHONE ENCOUNTER
Jennifer states she has had a cold for few days without fever, and now has started having mild nausea/ vomiting and diarrhea/ loose stools.  Patient denies fever, and has not taken anything over the counter due to upset stomach.  She reports +FM.  Denies contractions, vb, or other ob complaints.  Discussed safe otc meds for common cold symtpoms including  Sudafed-decongestion, Robitussin-DM (cough), tylenol (pain or fever), Mucinex (sinus pressure/ drainage), and Diphenhydramine/ Benadryl (drainage/ and may help with the nausea a bit as well in her case).  Discussed warning s/s and when to call back if no improvement, she is keeping fluids and food down at this time.  Discussed BRAT diet (bananas, rice, applesauce and toast), small frequent meals, rest and hydration.  F/u at next routine LILIYA visit or call prior with any further concerns.   Gardenia Singh CNM, MSN  11/20/2018  3:05 PM

## 2018-11-28 ENCOUNTER — ROUTINE PRENATAL (OUTPATIENT)
Dept: OBSTETRICS AND GYNECOLOGY | Facility: CLINIC | Age: 28
End: 2018-11-28
Payer: MEDICAID

## 2018-11-28 ENCOUNTER — CLINICAL SUPPORT (OUTPATIENT)
Dept: OBSTETRICS AND GYNECOLOGY | Facility: CLINIC | Age: 28
End: 2018-11-28
Payer: MEDICAID

## 2018-11-28 VITALS — WEIGHT: 181 LBS | DIASTOLIC BLOOD PRESSURE: 62 MMHG | SYSTOLIC BLOOD PRESSURE: 102 MMHG | BODY MASS INDEX: 33.1 KG/M2

## 2018-11-28 DIAGNOSIS — Z34.93 PRENATAL CARE IN THIRD TRIMESTER: Primary | ICD-10-CM

## 2018-11-28 PROCEDURE — 99213 OFFICE O/P EST LOW 20 MIN: CPT | Mod: S$PBB,TH,, | Performed by: ADVANCED PRACTICE MIDWIFE

## 2018-11-28 PROCEDURE — 99999 PR PBB SHADOW E&M-EST. PATIENT-LVL II: CPT | Mod: PBBFAC,,, | Performed by: ADVANCED PRACTICE MIDWIFE

## 2018-11-28 PROCEDURE — 99999 PR PBB SHADOW E&M-EST. PATIENT-LVL II: CPT | Mod: PBBFAC,,,

## 2018-11-28 PROCEDURE — 99212 OFFICE O/P EST SF 10 MIN: CPT | Mod: PBBFAC,25

## 2018-11-28 PROCEDURE — 90471 IMMUNIZATION ADMIN: CPT | Mod: PBBFAC

## 2018-11-28 PROCEDURE — 99212 OFFICE O/P EST SF 10 MIN: CPT | Mod: PBBFAC,25,27 | Performed by: ADVANCED PRACTICE MIDWIFE

## 2018-12-04 ENCOUNTER — PATIENT MESSAGE (OUTPATIENT)
Dept: OBSTETRICS AND GYNECOLOGY | Facility: HOSPITAL | Age: 28
End: 2018-12-04

## 2018-12-05 NOTE — PROGRESS NOTES
"28 y.o. female  at 30w2d by 7wk ultrasound   Reports + FM, denies VB, LOF or CTX  Doing well without concerns.  Accompanied by partner "J"  TW lbs   28 week labs normal, hgb 10.7 (taking iron) tolerating with occasional constipation symptoms-reviewed comfort measures and otc meds safe to use for constipation    Tdap scheduled for today.  Reviewed warning signs, normal FKCs,  labor precautions and how/when to call.  RTC x 2 wks, call or present sooner prn.     Birth Center Risk Assessment: 0  0- CNM management in ABC  1- CNM management on L&D  2- Consultation with OB to develop plan of care  3- Collaborative CNM/OB management with delivery on L&D  4- Referral or transfer of care to MD Gardenia Singh CNM, MSN  2018  6:42 PM        "

## 2018-12-06 ENCOUNTER — TELEPHONE (OUTPATIENT)
Dept: OBSTETRICS AND GYNECOLOGY | Facility: CLINIC | Age: 28
End: 2018-12-06

## 2018-12-06 NOTE — TELEPHONE ENCOUNTER
"Spoke with ZAYNAB and noted the pts chart with the chosen pediatric group [facility]       ----- Message from Ana María Pineda sent at 12/6/2018 10:21 AM CST -----  Contact: Patient's spouse / ph# [phone]    Name of Who is Calling: Patient's spouse       What is the request in detail:  Patient's spouse called to make this office aware they have chosen a pediatrician. Says,  "they have selected [facility]."   Please give a call back at your earliest convenience.      THANKS!      Can the clinic reply by [facility]: NO      What Number to Call Back: Patient's spouse / ph# [phone]                                      "

## 2018-12-12 ENCOUNTER — ROUTINE PRENATAL (OUTPATIENT)
Dept: OBSTETRICS AND GYNECOLOGY | Facility: CLINIC | Age: 28
End: 2018-12-12
Payer: MEDICAID

## 2018-12-12 VITALS
BODY MASS INDEX: 33.77 KG/M2 | SYSTOLIC BLOOD PRESSURE: 118 MMHG | WEIGHT: 184.63 LBS | DIASTOLIC BLOOD PRESSURE: 66 MMHG

## 2018-12-12 DIAGNOSIS — Z34.93 PREGNANT AND NOT YET DELIVERED IN THIRD TRIMESTER: Primary | ICD-10-CM

## 2018-12-12 PROCEDURE — 99212 OFFICE O/P EST SF 10 MIN: CPT | Mod: TH,S$PBB,, | Performed by: ADVANCED PRACTICE MIDWIFE

## 2018-12-12 PROCEDURE — 99212 OFFICE O/P EST SF 10 MIN: CPT | Mod: PBBFAC | Performed by: ADVANCED PRACTICE MIDWIFE

## 2018-12-12 PROCEDURE — 99999 PR PBB SHADOW E&M-EST. PATIENT-LVL II: CPT | Mod: PBBFAC,,, | Performed by: ADVANCED PRACTICE MIDWIFE

## 2018-12-12 NOTE — PROGRESS NOTES
28 y.o. female  at 31w3d  Here with J  Reports + FM, denies VB, LOF or CTX  Doing well without concerns  TW lbs   Reviewed 28wk lab results (B POS)  Reviewed upcoming 36wk labs   Reviewed warning signs, normal FKCs,  labor precautions and how/when to call.  RTC x 2 wks, call or present sooner prn.     Birth Center Risk Assessment: 0- Meets birth center guidelines    0- CNM management in ABC  1- CNM management on L&D  2- Consultation with OB to develop  plan of care  3- Collaborative CNM/OB management with delivery on L&D  4- Permanent referral of care to MD

## 2018-12-26 ENCOUNTER — ROUTINE PRENATAL (OUTPATIENT)
Dept: OBSTETRICS AND GYNECOLOGY | Facility: CLINIC | Age: 28
End: 2018-12-26
Payer: MEDICAID

## 2018-12-26 VITALS
DIASTOLIC BLOOD PRESSURE: 60 MMHG | BODY MASS INDEX: 33.13 KG/M2 | WEIGHT: 181.13 LBS | SYSTOLIC BLOOD PRESSURE: 112 MMHG

## 2018-12-26 DIAGNOSIS — Z34.93 PREGNANCY WITH ONE FETUS IN THIRD TRIMESTER: ICD-10-CM

## 2018-12-26 PROBLEM — Z34.92 PRENATAL CARE IN SECOND TRIMESTER: Status: RESOLVED | Noted: 2018-10-15 | Resolved: 2018-12-26

## 2018-12-26 PROCEDURE — 99213 OFFICE O/P EST LOW 20 MIN: CPT | Mod: PBBFAC | Performed by: ADVANCED PRACTICE MIDWIFE

## 2018-12-26 PROCEDURE — 99213 OFFICE O/P EST LOW 20 MIN: CPT | Mod: TH,S$PBB,, | Performed by: ADVANCED PRACTICE MIDWIFE

## 2018-12-26 PROCEDURE — 99999 PR PBB SHADOW E&M-EST. PATIENT-LVL III: CPT | Mod: PBBFAC,,, | Performed by: ADVANCED PRACTICE MIDWIFE

## 2018-12-26 NOTE — PROGRESS NOTES
Here for routine prenatal visit, unaccompanied today.  Reports doing well. Denies UCs (occasional BH, 2-3/day), denies LOF, denies VB. Reports good FM; education re: importance of daily FMC.  TWlb  Desires to breast feed.  She has a car seat. Peds: [facility].  Reviewed contraception options postpartum. She will consider.  Education re: warning s/s PTL and PreE.  RTC in 2wks, sooner PRN.  Birth Center Risk Assessment: 0- Meets birth center guidelines    0- CNM management in ABC  1- CNM management on L&D  2- Consultation with OB to develop plan of care  3- Collaborative CNM/OB management with delivery on L&D  4- Permanent referral of care to MD

## 2018-12-27 ENCOUNTER — PROCEDURE VISIT (OUTPATIENT)
Dept: MATERNAL FETAL MEDICINE | Facility: CLINIC | Age: 28
End: 2018-12-27
Payer: MEDICAID

## 2018-12-27 DIAGNOSIS — D25.9 UTERINE FIBROIDS AFFECTING PREGNANCY, ANTEPARTUM: ICD-10-CM

## 2018-12-27 DIAGNOSIS — O34.10 UTERINE FIBROIDS AFFECTING PREGNANCY, ANTEPARTUM: ICD-10-CM

## 2018-12-27 DIAGNOSIS — Z36.89 ENCOUNTER FOR ULTRASOUND TO CHECK FETAL GROWTH: ICD-10-CM

## 2018-12-27 PROCEDURE — 76816 OB US FOLLOW-UP PER FETUS: CPT | Mod: 26,S$PBB,, | Performed by: OBSTETRICS & GYNECOLOGY

## 2018-12-27 PROCEDURE — 76816 OB US FOLLOW-UP PER FETUS: CPT | Mod: PBBFAC | Performed by: OBSTETRICS & GYNECOLOGY

## 2018-12-27 PROCEDURE — 99499 UNLISTED E&M SERVICE: CPT | Mod: S$PBB,,, | Performed by: OBSTETRICS & GYNECOLOGY

## 2018-12-27 NOTE — PROGRESS NOTES
Obstetrical ultrasound completed today.  See report in imaging section of [facility].

## 2019-01-03 ENCOUNTER — TELEPHONE (OUTPATIENT)
Dept: OBSTETRICS AND GYNECOLOGY | Facility: CLINIC | Age: 29
End: 2019-01-03

## 2019-01-03 NOTE — TELEPHONE ENCOUNTER
Returned call to pt.  Left  message advising pt to return call to the clinic         ----- Message from Nola Shirley sent at 1/3/2019 12:05 PM CST -----  Contact: STEFFANY HARRY [[id number]]            Name of Who is Calling:STEFFANY HARRY [[id number]]    What is the request in detail: Patient needs to be advised on dental care. Please call her.       Can the clinic reply by [facility]:no      What Number to Call Back if not in [facility]: [phone]

## 2019-01-10 ENCOUNTER — ROUTINE PRENATAL (OUTPATIENT)
Dept: OBSTETRICS AND GYNECOLOGY | Facility: CLINIC | Age: 29
End: 2019-01-10
Payer: MEDICAID

## 2019-01-10 ENCOUNTER — LAB VISIT (OUTPATIENT)
Dept: LAB | Facility: OTHER | Age: 29
End: 2019-01-10
Attending: OBSTETRICS & GYNECOLOGY
Payer: MEDICAID

## 2019-01-10 VITALS
SYSTOLIC BLOOD PRESSURE: 100 MMHG | DIASTOLIC BLOOD PRESSURE: 64 MMHG | BODY MASS INDEX: 34.52 KG/M2 | WEIGHT: 188.69 LBS

## 2019-01-10 DIAGNOSIS — Z34.02 ENCOUNTER FOR SUPERVISION OF NORMAL FIRST PREGNANCY IN SECOND TRIMESTER: ICD-10-CM

## 2019-01-10 DIAGNOSIS — Z34.93 PRENATAL CARE IN THIRD TRIMESTER: Primary | ICD-10-CM

## 2019-01-10 DIAGNOSIS — N91.2 AMENORRHEA: ICD-10-CM

## 2019-01-10 LAB — HCG INTACT+B SERPL-ACNC: NORMAL MIU/ML

## 2019-01-10 PROCEDURE — 99213 PR OFFICE/OUTPT VISIT, EST, LEVL III, 20-29 MIN: ICD-10-PCS | Mod: S$PBB,TH,, | Performed by: ADVANCED PRACTICE MIDWIFE

## 2019-01-10 PROCEDURE — 99213 OFFICE O/P EST LOW 20 MIN: CPT | Mod: S$PBB,TH,, | Performed by: ADVANCED PRACTICE MIDWIFE

## 2019-01-10 PROCEDURE — 99999 PR PBB SHADOW E&M-EST. PATIENT-LVL II: ICD-10-PCS | Mod: PBBFAC,,, | Performed by: ADVANCED PRACTICE MIDWIFE

## 2019-01-10 PROCEDURE — 84702 CHORIONIC GONADOTROPIN TEST: CPT

## 2019-01-10 PROCEDURE — 99999 PR PBB SHADOW E&M-EST. PATIENT-LVL II: CPT | Mod: PBBFAC,,, | Performed by: ADVANCED PRACTICE MIDWIFE

## 2019-01-10 PROCEDURE — 87081 CULTURE SCREEN ONLY: CPT

## 2019-01-10 PROCEDURE — 36415 COLL VENOUS BLD VENIPUNCTURE: CPT

## 2019-01-10 PROCEDURE — 99212 OFFICE O/P EST SF 10 MIN: CPT | Mod: PBBFAC,TH | Performed by: ADVANCED PRACTICE MIDWIFE

## 2019-01-10 RX ORDER — PANTOPRAZOLE SODIUM 40 MG/1
40 TABLET, DELAYED RELEASE ORAL DAILY
Qty: 30 TABLET | Refills: 1 | Status: ON HOLD | OUTPATIENT
Start: 2019-01-10 | End: 2019-02-02 | Stop reason: HOSPADM

## 2019-01-12 LAB — BACTERIA SPEC AEROBE CULT: NORMAL

## 2019-01-14 NOTE — PROGRESS NOTES
28 y.o. female  at 35w4d   Reports + FM, denies VB, LOF or regular CTX  Doing well without concerns   TW lbs   Delivery consents given today, patient plans to refuse blood transfusion consent, advised she bring in [employer] witness papers that she mentions has acceptable volume expanders listed in event of hemorrhage--discussed importance of PLAN in event of hemorrhage. --see proper [facility] papers in office reserved for these circumstances.    GBS collected today   Reviewed [facility] recommendation for repeat HIV/RPR today; she seems open to this bloodwork and orders placed.    Discussed implications for peds r/t repeat HIV/RPR if she declines   Reviewed warning signs, normal FKCs, labor precautions and how/when to call.  RTC x 1wks, call or present sooner prn.   Birth Center Risk Assessment: 0  0- CNM management in ABC  1- CNM management on L&D  2- Consultation with OB to develop plan of care  3- Collaborative CNM/OB management with delivery on L&D  4- Referral or transfer of care to MD

## 2019-01-16 ENCOUNTER — ROUTINE PRENATAL (OUTPATIENT)
Dept: OBSTETRICS AND GYNECOLOGY | Facility: CLINIC | Age: 29
End: 2019-01-16
Payer: MEDICAID

## 2019-01-16 VITALS — DIASTOLIC BLOOD PRESSURE: 74 MMHG | BODY MASS INDEX: 34.29 KG/M2 | SYSTOLIC BLOOD PRESSURE: 118 MMHG | WEIGHT: 187.5 LBS

## 2019-01-16 DIAGNOSIS — Z34.93 PRENATAL CARE IN THIRD TRIMESTER: Primary | ICD-10-CM

## 2019-01-16 PROCEDURE — 99213 PR OFFICE/OUTPT VISIT, EST, LEVL III, 20-29 MIN: ICD-10-PCS | Mod: S$PBB,TH,, | Performed by: ADVANCED PRACTICE MIDWIFE

## 2019-01-16 PROCEDURE — 99212 OFFICE O/P EST SF 10 MIN: CPT | Mod: PBBFAC | Performed by: ADVANCED PRACTICE MIDWIFE

## 2019-01-16 PROCEDURE — 99999 PR PBB SHADOW E&M-EST. PATIENT-LVL II: CPT | Mod: PBBFAC,,, | Performed by: ADVANCED PRACTICE MIDWIFE

## 2019-01-16 PROCEDURE — 99999 PR PBB SHADOW E&M-EST. PATIENT-LVL II: ICD-10-PCS | Mod: PBBFAC,,, | Performed by: ADVANCED PRACTICE MIDWIFE

## 2019-01-16 PROCEDURE — 99213 OFFICE O/P EST LOW 20 MIN: CPT | Mod: S$PBB,TH,, | Performed by: ADVANCED PRACTICE MIDWIFE

## 2019-01-22 NOTE — PROGRESS NOTES
"28 y.o. female  at 37w2d   Reports + FM, denies VB, LOF or regular CTX  Doing well without concerns today, presents with partner "ZAYNAB" and states she forgot to bring papers from home that state what blood products she will accept (expanders), will plan to sign specific [facility] consents (reserved for those that refuse blood products) next visit.   She and her partner have differing opinions about transfusion in life threatening situation, explained to ZAYNAB that once Jennifer signs consents she will NOT be able to change her wishes if she is in a life threatening / hemorrhage situation.  She states understanding of this.  Jennifer is agreeable to IV ACCESS and postpartum pitocin  TW lbs   Reviewed warning signs, normal FKCs, labor precautions and how/when to call.  RTC x 1 wks, call or present sooner prn.   Birth Center Risk Assessment: 0  0- CNM management in ABC  1- CNM management on L&D  2- Consultation with OB to develop plan of care  3- Collaborative CNM/OB management with delivery on L&D  4- Referral or transfer of care to MD       "

## 2019-01-24 ENCOUNTER — ROUTINE PRENATAL (OUTPATIENT)
Dept: OBSTETRICS AND GYNECOLOGY | Facility: CLINIC | Age: 29
End: 2019-01-24
Payer: MEDICAID

## 2019-01-24 VITALS
SYSTOLIC BLOOD PRESSURE: 104 MMHG | WEIGHT: 189.38 LBS | DIASTOLIC BLOOD PRESSURE: 74 MMHG | BODY MASS INDEX: 34.64 KG/M2

## 2019-01-24 DIAGNOSIS — Z34.93 PREGNANCY WITH ONE FETUS IN THIRD TRIMESTER: Primary | ICD-10-CM

## 2019-01-24 PROCEDURE — 99212 OFFICE O/P EST SF 10 MIN: CPT | Mod: TH,S$PBB,, | Performed by: ADVANCED PRACTICE MIDWIFE

## 2019-01-24 PROCEDURE — 99999 PR PBB SHADOW E&M-EST. PATIENT-LVL II: CPT | Mod: PBBFAC,,, | Performed by: ADVANCED PRACTICE MIDWIFE

## 2019-01-24 PROCEDURE — 99212 OFFICE O/P EST SF 10 MIN: CPT | Mod: PBBFAC | Performed by: ADVANCED PRACTICE MIDWIFE

## 2019-01-24 PROCEDURE — 99999 PR PBB SHADOW E&M-EST. PATIENT-LVL II: ICD-10-PCS | Mod: PBBFAC,,, | Performed by: ADVANCED PRACTICE MIDWIFE

## 2019-01-24 PROCEDURE — 99212 PR OFFICE/OUTPT VISIT, EST, LEVL II, 10-19 MIN: ICD-10-PCS | Mod: TH,S$PBB,, | Performed by: ADVANCED PRACTICE MIDWIFE

## 2019-01-24 NOTE — PROGRESS NOTES
Subjective:      Jennifer Hanson is a 28 y.o. female being seen today for her obstetrical visit. She is at 37w4d gestation. Patient reports no complaints. Fetal movement: normal.  Here alone.  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       The following portions of the patient's history were reviewed and updated as appropriate: allergies, current medications, past family history, past medical history, past social history, past surgical history and problem list.    Objective:      /74   Wt 85.9 kg (189 lb 6 oz)   LMP 2018   BMI 34.64 kg/m²   FHT: 146 BPM   Uterine Size: size equals dates   Presentations: cephalic   Pelvic Exam:               Dilation: deferred        Effacement:               Station:       Consistency:              Position:         Assessment:      Pregnancy 37 and 4/7 weeks     Plan:    Plans for delivery: Vaginal anticipated  Beta strep culture: discussed  Counseling: na  Fetal testing: na  Follow up in 1 Week.

## 2019-01-30 ENCOUNTER — ROUTINE PRENATAL (OUTPATIENT)
Dept: OBSTETRICS AND GYNECOLOGY | Facility: CLINIC | Age: 29
End: 2019-01-30
Payer: MEDICAID

## 2019-01-30 VITALS
WEIGHT: 189.81 LBS | DIASTOLIC BLOOD PRESSURE: 64 MMHG | SYSTOLIC BLOOD PRESSURE: 106 MMHG | BODY MASS INDEX: 34.72 KG/M2

## 2019-01-30 DIAGNOSIS — Z34.03 ENCOUNTER FOR SUPERVISION OF NORMAL FIRST PREGNANCY IN THIRD TRIMESTER: Primary | ICD-10-CM

## 2019-01-30 PROCEDURE — 99999 PR PBB SHADOW E&M-EST. PATIENT-LVL II: CPT | Mod: PBBFAC,,, | Performed by: ADVANCED PRACTICE MIDWIFE

## 2019-01-30 PROCEDURE — 99999 PR PBB SHADOW E&M-EST. PATIENT-LVL II: ICD-10-PCS | Mod: PBBFAC,,, | Performed by: ADVANCED PRACTICE MIDWIFE

## 2019-01-30 PROCEDURE — 99213 PR OFFICE/OUTPT VISIT, EST, LEVL III, 20-29 MIN: ICD-10-PCS | Mod: S$PBB,TH,, | Performed by: ADVANCED PRACTICE MIDWIFE

## 2019-01-30 PROCEDURE — 99213 OFFICE O/P EST LOW 20 MIN: CPT | Mod: S$PBB,TH,, | Performed by: ADVANCED PRACTICE MIDWIFE

## 2019-01-30 PROCEDURE — 99212 OFFICE O/P EST SF 10 MIN: CPT | Mod: PBBFAC,TH | Performed by: ADVANCED PRACTICE MIDWIFE

## 2019-01-30 NOTE — PROGRESS NOTES
Doing well today  Alone today  Discussed flu vaccine  Still declines  C/O occas ctx  Denies VB/LOF  Declines VE today  Reviewed acceptable blood products if hemorrhage  Copy sent to medical records  Reviewed when to call in labor

## 2019-01-31 ENCOUNTER — HOSPITAL ENCOUNTER (INPATIENT)
Facility: OTHER | Age: 29
LOS: 2 days | Discharge: HOME | End: 2019-02-02
Attending: OBSTETRICS & GYNECOLOGY | Admitting: OBSTETRICS & GYNECOLOGY
Payer: MEDICAID

## 2019-01-31 DIAGNOSIS — Z3A.38 38 WEEKS GESTATION OF PREGNANCY: ICD-10-CM

## 2019-01-31 DIAGNOSIS — O42.90 AMNIOTIC FLUID LEAKING: ICD-10-CM

## 2019-01-31 PROBLEM — Z34.93 PREGNANCY WITH ONE FETUS IN THIRD TRIMESTER: Status: RESOLVED | Noted: 2018-12-26 | Resolved: 2019-01-31

## 2019-01-31 LAB
ABO + RH BLD: NORMAL
BASOPHILS # BLD AUTO: 0.01 K/UL
BASOPHILS NFR BLD: 0.1 %
BLD GP AB SCN CELLS X3 SERPL QL: NORMAL
DIFFERENTIAL METHOD: NORMAL
EOSINOPHIL # BLD AUTO: 0 K/UL
EOSINOPHIL NFR BLD: 0.3 %
ERYTHROCYTE [DISTWIDTH] IN BLOOD BY AUTOMATED COUNT: 13.4 %
HCT VFR BLD AUTO: 38.1 %
HGB BLD-MCNC: 12.6 G/DL
HIV 1+2 AB+HIV1 P24 AG SERPL QL IA: NEGATIVE
LYMPHOCYTES # BLD AUTO: 1.9 K/UL
LYMPHOCYTES NFR BLD: 25.5 %
MCH RBC QN AUTO: 28.3 PG
MCHC RBC AUTO-ENTMCNC: 33.1 G/DL
MCV RBC AUTO: 85 FL
MONOCYTES # BLD AUTO: 0.6 K/UL
MONOCYTES NFR BLD: 7.9 %
NEUTROPHILS # BLD AUTO: 4.9 K/UL
NEUTROPHILS NFR BLD: 65.9 %
PLATELET # BLD AUTO: 331 K/UL
PMV BLD AUTO: 11.2 FL
RBC # BLD AUTO: 4.46 M/UL
RPR SER QL: NORMAL
WBC # BLD AUTO: 7.49 K/UL

## 2019-01-31 PROCEDURE — 86703 HIV-1/HIV-2 1 RESULT ANTBDY: CPT

## 2019-01-31 PROCEDURE — 59025 PR FETAL 2N-STRESS TEST: ICD-10-PCS | Mod: 26,,, | Performed by: OBSTETRICS & GYNECOLOGY

## 2019-01-31 PROCEDURE — 99283 PR EMERGENCY DEPT VISIT,LEVEL III: ICD-10-PCS | Mod: 25,,, | Performed by: OBSTETRICS & GYNECOLOGY

## 2019-01-31 PROCEDURE — 86592 SYPHILIS TEST NON-TREP QUAL: CPT

## 2019-01-31 PROCEDURE — 86901 BLOOD TYPING SEROLOGIC RH(D): CPT

## 2019-01-31 PROCEDURE — 59409 PR OBSTETRICAL CARE,VAG DELIV ONLY: ICD-10-PCS | Mod: AT,,, | Performed by: MIDWIFE

## 2019-01-31 PROCEDURE — 99283 EMERGENCY DEPT VISIT LOW MDM: CPT | Mod: 25,,, | Performed by: OBSTETRICS & GYNECOLOGY

## 2019-01-31 PROCEDURE — 59025 FETAL NON-STRESS TEST: CPT | Mod: 26,,, | Performed by: OBSTETRICS & GYNECOLOGY

## 2019-01-31 PROCEDURE — 72100002 HC LABOR CARE, 1ST 8 HOURS

## 2019-01-31 PROCEDURE — 25000003 PHARM REV CODE 250: Performed by: MIDWIFE

## 2019-01-31 PROCEDURE — 59025 FETAL NON-STRESS TEST: CPT

## 2019-01-31 PROCEDURE — 85025 COMPLETE CBC W/AUTO DIFF WBC: CPT

## 2019-01-31 PROCEDURE — 11000001 HC ACUTE MED/SURG PRIVATE ROOM

## 2019-01-31 PROCEDURE — 99285 EMERGENCY DEPT VISIT HI MDM: CPT | Mod: 25

## 2019-01-31 PROCEDURE — 59409 OBSTETRICAL CARE: CPT | Mod: AT,,, | Performed by: MIDWIFE

## 2019-01-31 RX ORDER — METHYLERGONOVINE MALEATE 0.2 MG/ML
200 INJECTION INTRAVENOUS
Status: DISCONTINUED | OUTPATIENT
Start: 2019-01-31 | End: 2019-02-02 | Stop reason: HOSPADM

## 2019-01-31 RX ORDER — DIPHENHYDRAMINE HCL 25 MG
25 CAPSULE ORAL EVERY 4 HOURS PRN
Status: DISCONTINUED | OUTPATIENT
Start: 2019-01-31 | End: 2019-02-02 | Stop reason: HOSPADM

## 2019-01-31 RX ORDER — HYDROCORTISONE 25 MG/G
CREAM TOPICAL 3 TIMES DAILY PRN
Status: DISCONTINUED | OUTPATIENT
Start: 2019-01-31 | End: 2019-02-02 | Stop reason: HOSPADM

## 2019-01-31 RX ORDER — AMMONIA 15 % (W/V)
0.3 AMPUL (EA) INHALATION CONTINUOUS PRN
Status: DISCONTINUED | OUTPATIENT
Start: 2019-01-31 | End: 2019-02-02 | Stop reason: HOSPADM

## 2019-01-31 RX ORDER — OXYTOCIN/RINGER'S LACTATE 20/1000 ML
41.65 PLASTIC BAG, INJECTION (ML) INTRAVENOUS CONTINUOUS
Status: ACTIVE | OUTPATIENT
Start: 2019-01-31 | End: 2019-01-31

## 2019-01-31 RX ORDER — IBUPROFEN 600 MG/1
600 TABLET ORAL EVERY 6 HOURS PRN
Status: DISCONTINUED | OUTPATIENT
Start: 2019-01-31 | End: 2019-02-02 | Stop reason: HOSPADM

## 2019-01-31 RX ORDER — LIDOCAINE HYDROCHLORIDE 10 MG/ML
INJECTION INFILTRATION; PERINEURAL
Status: DISCONTINUED
Start: 2019-01-31 | End: 2019-01-31 | Stop reason: WASHOUT

## 2019-01-31 RX ORDER — ACETAMINOPHEN 325 MG/1
650 TABLET ORAL EVERY 6 HOURS PRN
Status: DISCONTINUED | OUTPATIENT
Start: 2019-01-31 | End: 2019-02-02 | Stop reason: HOSPADM

## 2019-01-31 RX ORDER — ONDANSETRON 8 MG/1
8 TABLET, ORALLY DISINTEGRATING ORAL EVERY 8 HOURS PRN
Status: DISCONTINUED | OUTPATIENT
Start: 2019-01-31 | End: 2019-02-02 | Stop reason: HOSPADM

## 2019-01-31 RX ORDER — ONDANSETRON 8 MG/1
8 TABLET, ORALLY DISINTEGRATING ORAL EVERY 8 HOURS PRN
Status: CANCELLED | OUTPATIENT
Start: 2019-01-31

## 2019-01-31 RX ORDER — MISOPROSTOL 200 UG/1
200 TABLET ORAL
Status: DISCONTINUED | OUTPATIENT
Start: 2019-01-31 | End: 2019-02-02 | Stop reason: HOSPADM

## 2019-01-31 RX ORDER — CARBOPROST TROMETHAMINE 250 UG/ML
250 INJECTION, SOLUTION INTRAMUSCULAR
Status: DISCONTINUED | OUTPATIENT
Start: 2019-01-31 | End: 2019-02-02 | Stop reason: HOSPADM

## 2019-01-31 RX ORDER — SODIUM CHLORIDE 9 MG/ML
INJECTION, SOLUTION INTRAVENOUS
Status: DISCONTINUED | OUTPATIENT
Start: 2019-01-31 | End: 2019-02-02 | Stop reason: HOSPADM

## 2019-01-31 RX ORDER — OXYTOCIN 10 [USP'U]/ML
INJECTION, SOLUTION INTRAMUSCULAR; INTRAVENOUS
Status: DISCONTINUED
Start: 2019-01-31 | End: 2019-01-31 | Stop reason: WASHOUT

## 2019-01-31 RX ORDER — HYDROCODONE BITARTRATE AND ACETAMINOPHEN 5; 325 MG/1; MG/1
1 TABLET ORAL EVERY 4 HOURS PRN
Status: DISCONTINUED | OUTPATIENT
Start: 2019-01-31 | End: 2019-02-02 | Stop reason: HOSPADM

## 2019-01-31 RX ORDER — OXYTOCIN/RINGER'S LACTATE 20/1000 ML
41.7 PLASTIC BAG, INJECTION (ML) INTRAVENOUS CONTINUOUS
Status: ACTIVE | OUTPATIENT
Start: 2019-01-31 | End: 2019-01-31

## 2019-01-31 RX ORDER — OXYTOCIN/RINGER'S LACTATE 20/1000 ML
333 PLASTIC BAG, INJECTION (ML) INTRAVENOUS CONTINUOUS
Status: ACTIVE | OUTPATIENT
Start: 2019-01-31 | End: 2019-01-31

## 2019-01-31 RX ADMIN — IBUPROFEN 600 MG: 600 TABLET ORAL at 11:01

## 2019-01-31 RX ADMIN — HYDROCODONE BITARTRATE AND ACETAMINOPHEN 1 TABLET: 5; 325 TABLET ORAL at 03:01

## 2019-01-31 RX ADMIN — IBUPROFEN 600 MG: 600 TABLET ORAL at 06:01

## 2019-01-31 NOTE — HOSPITAL COURSE
2019 - PROM with clear fluid at 0034. Admitted to L&D, expectant management, pt desires to get in tub --pt progressed to , no complications. Female infant.  2019 - PPD#1, routine PP care  2019 - PPD#2, desires discharge

## 2019-01-31 NOTE — ASSESSMENT & PLAN NOTE
- Admit to L&D  - Reviewed consents together and pt signed (pt also reviewed blood product refusal consent with Anesthesia)  - Anesthesia consult  - Draw labs (CBC and Type & Screen), pt desires 3T labs drawn as well and agreeable to IV start at same time. Saline lock IV.  - Discussed option for augmentation vs expectant management, pt declines augmentation at this time.  - Cephalic per VE and Leopold's  - Intermittent EFM per protocol  - Continue PO hydration - clear liquid diet  -Expectant management

## 2019-01-31 NOTE — ED PROVIDER NOTES
"Encounter Date: 2019       History     Chief Complaint   Patient presents with    Rupture of Membranes     Jennifer Hanson is a 28 y.o. female  at 38w4d with Estimated Date of Delivery: 2/10/19 based on 7wk dating ultrasound who presents to OB ED c/o leaking of amniotic fluid.  Reports some mild irregular uterine contractions over the last hour. Reports "three little gushes of fluid at 0034".   She reports + FM. Denies VB.  Accompanied by J, her spouse, to L&D. Expecting a surprise gender!    Pregnancy has been c/b:     PCOS (polycystic ovarian syndrome)     Uterine fibroids affecting pregnancy, antepartum     Right ovarian cyst     Anatomy US -- absent fetal nasal bone      Anemia - Fe                   Review of patient's allergies indicates:   Allergen Reactions    Pcn [penicillins] Other (See Comments)     Uncertain of reaction - just told from her mother that since she was little she is allergic     Past Medical History:   Diagnosis Date    Abnormal cervical Papanicolaou smear     repeat paps    PCOS (polycystic ovarian syndrome)     Metformin late , stopped 2018    Prenatal care in second trimester 10/15/2018     Past Surgical History:   Procedure Laterality Date    KNEE SURGERY      WISDOM TOOTH EXTRACTION       Family History   Problem Relation Age of Onset    No Known Problems Father     Thyroid disease Mother     No Known Problems Sister     No Known Problems Brother     No Known Problems Maternal Aunt     No Known Problems Maternal Uncle     No Known Problems Paternal Aunt     No Known Problems Paternal Uncle     Cataracts Maternal Grandmother     Hypertension Maternal Grandmother     Stroke Maternal Grandfather     No Known Problems Paternal Grandmother     Lung cancer Paternal Grandfather         Environmental    Breast cancer Neg Hx     Colon cancer Neg Hx     Ovarian cancer Neg Hx     Amblyopia Neg Hx     Blindness Neg Hx     Cancer Neg Hx     " Diabetes Neg Hx     Glaucoma Neg Hx     Macular degeneration Neg Hx     Retinal detachment Neg Hx     Strabismus Neg Hx      Social History     Tobacco Use    Smoking status: Never Smoker    Smokeless tobacco: Never Used   Substance Use Topics    Alcohol use: Yes    Drug use: No     Comment: None with pregnancy     Review of Systems   Constitutional: Negative for fever.   HENT: Negative for sore throat.    Respiratory: Negative for shortness of breath.    Cardiovascular: Negative for chest pain.   Gastrointestinal: Negative for nausea. Abdominal pain: Cramping.   Genitourinary: Positive for vaginal discharge. Negative for dysuria and vaginal bleeding.   Musculoskeletal: Negative for back pain.   Skin: Negative for rash.   Neurological: Negative for weakness.   Hematological: Does not bruise/bleed easily.       Physical Exam     Initial Vitals [01/31/19 0124]   BP Pulse Resp Temp SpO2   123/84 99 18 97 °F (36.1 °C) 99 %      MAP       --         Physical Exam    Nursing note and vitals reviewed.  Constitutional: She appears well-developed and well-nourished.   HENT:   Head: Normocephalic and atraumatic.   Eyes: EOM are normal. Pupils are equal, round, and reactive to light.   Neck: Normal range of motion. Neck supple.   Cardiovascular: Normal rate and regular rhythm.   Pulmonary/Chest: Breath sounds normal.   Genitourinary: No vaginal discharge found.   Genitourinary Comments: Spec exam performed: Vaginal mucosa pink, no lesions. Clear amniotic fluid noted to be pooling, +nitrazine, +valsalva   Musculoskeletal: Normal range of motion.   Neurological: She is alert and oriented to person, place, and time.   Skin: Skin is warm and dry. Capillary refill takes less than 2 seconds.   Psychiatric: She has a normal mood and affect.     OB LABOR EXAM:     Membranes ruptured: Yes.     Vaginal Bleeding: none present.   Engagement: engaged.   Dilatation: 4.   Station: -2.   Effacement: 70%.   Amniotic Fluid Color:  normal.   Amniotic Fluid Amount: moderate.         ED Course   Obtain Fetal nonstress test (NST)  Date/Time: 2019 6:28 AM  Performed by: Sade Prabhakar CNM  Authorized by: Sade Prabhakar CNM     Nonstress Test:     Variability:  6-25 BPM    Decelerations:  None    Accelerations:  15 bpm    Acoustic Stimulator: No      Baseline:  140    Contractions:  Irregular  Biophysical Profile:     Nonstress Test Interpretation: reactive      Overall Impression:  Reassuring  Post-procedure:     Patient tolerance:  Patient tolerated the procedure well with no immediate complications      Labs Reviewed - No data to display       Imaging Results    None          Medical Decision Making:   ED Management:   hu IUP at 38w 4d with spontaneous rupture of membranes    -  Admit to L&D              Attending Attestation:   Physician Attestation Statement for Resident:  As the supervising MD   Physician Attestation Statement: I have personally seen and examined this patient.   I agree with the above history. -:   As the supervising MD I agree with the above PE.    As the supervising MD I agree with the above treatment, course, plan, and disposition.   -:   NST  I independently reviewed the fetal non-stress test with the following interpretation:  140 BPM baseline  Variability: moderate  Accelerations: present  Decelerations: absent  Contractions: occasional  Category 1    Clinical Interpretation:reactive    Patient evaluated and found to be stable, agree with LUIS's assessment of SROM at term and plan to admit to L+D.  I was personally present during the critical portions of the procedure(s) performed by the resident and was immediately available in the ED to provide services and assistance as needed during the entire procedure.                       Clinical Impression:   The primary encounter diagnosis was Amniotic fluid leaking. A diagnosis of 38 weeks gestation of pregnancy was also pertinent to this visit.                              Sade Prabhakar CNM  01/31/19 0630       Annika Parmar MD  01/31/19 0645

## 2019-01-31 NOTE — SUBJECTIVE & OBJECTIVE
Interval History:  Jennifer is a 28 y.o.  at 38w4d. She is doing well. Breathing through contractions, s/o at bedside    Objective:     Vital Signs (Most Recent):  Temp: 98.3 °F (36.8 °C) (19)  Pulse: 96 (19)  Resp: 16 (19)  BP: (!) 115/56 (19)  SpO2: 99 % (19 0440) Vital Signs (24h Range):  Temp:  [97 °F (36.1 °C)-99 °F (37.2 °C)] 98.3 °F (36.8 °C)  Pulse:  [90-99] 96  Resp:  [16-18] 16  SpO2:  [98 %-99 %] 99 %  BP: (106-123)/(56-84) 115/56     Weight: 86.5 kg (190 lb 11.2 oz)  Body mass index is 34.88 kg/m².    FHT: Cat 1 (reassuring)  TOCO:  Q 3-5 minutes    Cervical Exam:  Dilation:  6.5  Effacement:  100%  Station: 0  Presentation: Vertex     Significant Labs:  Lab Results   Component Value Date    GROUPTRH B POS 2019    HEPBSAG Negative 2018    STREPBCULT No Group B Streptococcus isolated 01/10/2019       I have personallly reviewed all pertinent lab results from the last 24 hours.  Recent Lab Results       19  0235        Baso # 0.01     Basophil% 0.1     Differential Method Automated     Eos # 0.0     Eosinophil% 0.3     Gran # (ANC) 4.9     Gran% 65.9     Group & Rh B POS     Hematocrit 38.1     Hemoglobin 12.6     INDIRECT KRISTOPHER NEG     Lymph # 1.9     Lymph% 25.5     MCH 28.3     MCHC 33.1     MCV 85     Mono # 0.6     Mono% 7.9     MPV 11.2     Platelets 331     RBC 4.46     RDW 13.4     WBC 7.49           Physical Exam:   Constitutional: She is oriented to person, place, and time. She appears well-developed and well-nourished.    HENT:   Head: Normocephalic.     Neck: Normal range of motion.     Pulmonary/Chest: Effort normal.        Abdominal: Soft.     Genitourinary:   Genitourinary Comments: + vaginal discharge, clear amniotic fluid, + bloody show           Musculoskeletal: Normal range of motion.       Neurological: She is alert and oriented to person, place, and time.    Skin: Skin is warm and dry.    Psychiatric: She has a  normal mood and affect. Her behavior is normal. Judgment and thought content normal.

## 2019-01-31 NOTE — H&P
[facility]  Obstetrics  History & Physical    Patient Name: Jennifer Hanson  MRN: [id number]  Admission Date: 2019  Primary Care Provider: Primary Doctor No    Subjective:     Principal Problem:Indication for care in labor and delivery, antepartum    History of Present Illness:  History of Present Illness:   Jennifer Hanson is a 28 y.o. female  at 38w4d with Estimated Date of Delivery: 2/10/19 based on 7wk ultrasound who presents to Labor and Delivery accompanied by her spouse, ZAYNAB. Expecting a surprise gender!    Reports leaking amniotic fluid since 0034, with irregular uterine contractions since that started around 0130. They are now 10-15 minutes apart and increasing in intensity and duration.  Mild-moderate contractions, active fetal movement, No vaginal bleeding.     Last ate awilda food at 1800, sipping water at the bedside.     This pregnancy has been complicated by:  Patient Active Problem List   Diagnosis    PCOS (polycystic ovarian syndrome)    Uterine fibroids affecting pregnancy, antepartum    Right ovarian cyst    Anatomy US -- absent fetal nasal bone     Anemia - Fe        Presentation:  Cephalic  Estimated Fetal Weight: 7lb    Birth Center Risk Assessment: 1-management on labor and Delivery (PROM)    0- CNM management in ABC  1- CNM management on L&D  2- Consultation with OB to develop  plan of care  3- Collaborative CNM/OB management with delivery on L&D  4- Permanent referral of care to MD        Obstetric HPI:  Patient coping well with UCs, able to talk and walk through them, but aware of them.  Continued LOF. Declines pain management at this time (desires an unmedicated birth).    Obstetric History       T0      L0     SAB0   TAB0   Ectopic0   Multiple0   Live Births0       # Outcome Date GA Lbr Kenneth/2nd Weight Sex Delivery Anes PTL Lv   1 Current                 Past Medical History:   Diagnosis Date    Abnormal cervical Papanicolaou smear      repeat paps    PCOS (polycystic ovarian syndrome)     Metformin late 2015, stopped 9/2018    Prenatal care in second trimester 10/15/2018     Past Surgical History:   Procedure Laterality Date    KNEE SURGERY  2003    WISDOM TOOTH EXTRACTION         PTA Medications   Medication Sig    docosahexanoic acid (DHA PRENATAL ORAL) Take by mouth.    ferrous sulfate (FEOSOL) 325 mg (65 mg iron) Tab tablet Take 1 tablet (325 mg total) by mouth once daily.    pantoprazole (PROTONIX) 40 MG tablet Take 1 tablet (40 mg total) by mouth once daily.    prenatal 48-iron-folic acid-B6 (CITRANATAL B-CALM, FE GLUC,) 20 mg iron-1 mg -25 mg/25 mg TbSQ Take 1 tablet by mouth once daily.       Review of patient's allergies indicates:   Allergen Reactions    Pcn [penicillins] Other (See Comments)     Uncertain of reaction - just told from her mother that since she was little she is allergic        Family History     Problem Relation (Age of Onset)    Cataracts Maternal Grandmother    Hypertension Maternal Grandmother    Lung cancer Paternal Grandfather    No Known Problems Father, Sister, Brother, Maternal Aunt, Maternal Uncle, Paternal Aunt, Paternal Uncle, Paternal Grandmother    Stroke Maternal Grandfather    Thyroid disease Mother        Tobacco Use    Smoking status: Never Smoker    Smokeless tobacco: Never Used   Substance and Sexual Activity    Alcohol use: Yes    Drug use: No     Comment: None with pregnancy    Sexual activity: Yes     Partners: Female     Birth control/protection: None     Comment: Partner J     Review of Systems   Constitutional: Negative for activity change, chills, fever and unexpected weight change.   Eyes: Negative for visual disturbance.   Respiratory: Negative for cough and shortness of breath.    Cardiovascular: Negative for chest pain.   Gastrointestinal: Positive for abdominal pain (Cramping). Negative for constipation, diarrhea, nausea and vomiting.   Genitourinary: Positive for vaginal  discharge (Clear fluid). Negative for dysuria, genital sores, vaginal bleeding and vaginal odor.   Neurological: Negative for syncope and headaches.   Psychiatric/Behavioral: Negative for depression.      Objective:     Vital Signs (Most Recent):  Temp: 99 °F (37.2 °C) (19 0552)  Pulse: 93 (19 0440)  Resp: 18 (190)  BP: 118/68 (19 0440)  SpO2: 99 % (19) Vital Signs (24h Range):  Temp:  [97 °F (36.1 °C)-99 °F (37.2 °C)] 99 °F (37.2 °C)  Pulse:  [90-99] 93  Resp:  [18] 18  SpO2:  [98 %-99 %] 99 %  BP: (106-123)/(62-84) 118/68     Weight: 86.5 kg (190 lb 11.2 oz)  Body mass index is 34.88 kg/m².    FHT:  Baseline 145, moderate BTBV, positive accels, no decels. Cat 1 (reassuring)  TOCO:  Q 5-10 minutes    Physical Exam:   Constitutional: She is oriented to person, place, and time. She appears well-developed and well-nourished.    HENT:   Head: Normocephalic and atraumatic.     Neck: Normal range of motion.    Cardiovascular: Normal rate and regular rhythm.     Pulmonary/Chest: Effort normal. No respiratory distress.        Abdominal: Soft. Distention: Gravid. There is no tenderness. There is no guarding.             Musculoskeletal: Normal range of motion and moves all extremeties.       Neurological: She is alert and oriented to person, place, and time.    Skin: Skin is warm, dry and intact. No rash noted. No erythema.    Psychiatric: She has a normal mood and affect. Her behavior is normal.       Cervix: Per SVE in OB ED  Dilation:  4  Effacement:  70%  Station: -2  Presentation: Vertex     Significant Labs:  Lab Results   Component Value Date    GROUPTRH B POS 2019    HEPBSAG Negative 2018    STREPBCULT No Group B Streptococcus isolated 01/10/2019       I have personallly reviewed all pertinent lab results from the last 24 hours.    Assessment/Plan:     28 y.o. female  at 38w4d for:    * Indication for care in labor and delivery, antepartum    - Admit to  L&D  - Reviewed consents together and pt signed (pt also reviewed blood product refusal consent with Anesthesia)  - Anesthesia consult  - Draw labs (CBC and Type & Screen), pt desires 3T labs drawn as well and agreeable to IV start at same time. Saline lock IV.  - Discussed option for augmentation vs expectant management, pt declines augmentation at this time.  - Cephalic per VE and Leopold's  - Intermittent EFM per protocol  - Continue PO hydration - clear liquid diet       Amniotic fluid leaking    Clear amniotic fluid leaking     Anemia - Fe    Pt has been taking PO FE.  Repeat CBC today       ALAN Reyes CNM  Obstetrics  [facility]

## 2019-01-31 NOTE — PROGRESS NOTES
"LABOR NOTE    S:  Pt laying of left lat in bed, breathing through UCs and unable to talk through them.  Coping well. Reports some new pressure with some UCs (which are now approx 10min apart).  J at bedside and supportive.     O: BP (!) 115/56   Pulse 96   Temp 99.0 °F (36.8 °C) (Oral)   Resp 16   Ht 5' 2" (1.575 m)   Wt 86.5 kg (190 lb 11.2 oz)   LMP 2018   SpO2 99%   Breastfeeding? No   BMI 34.88 kg/m²     GENERAL: Calm and appropriate affect  NEURO: Alert, oriented, normal speech  ABDOMEN: Nontender, Fundus palpates soft between UC's.  FHT: Baseline 135, moderate BTBV, positive accels, no decels. Cat 1, reassuring.  CTX: q 10 minutes  SVE: /-2      ASSESSMENT:   28 y.o.  IUP at 38w4d, FHT reassuring/ Cat 1    Patient Active Problem List   Diagnosis    PCOS (polycystic ovarian syndrome)    Uterine fibroids affecting pregnancy, antepartum    Right ovarian cyst    Anatomy US -- absent fetal nasal bone     Anemia - Fe    Pregnancy with one fetus in third trimester    Amniotic fluid leaking    Indication for care in labor and delivery, antepartum         PLAN:  Repositioned in the bed with pillows and encouraged frequent position changes to facilitate labor progress. Continue expectant management  Continue IFM and close Maternal/Fetal monitoring  Recheck 2-4 hours or PRN      Sade Prabhakar CNM    "

## 2019-01-31 NOTE — PROGRESS NOTES
[facility]  Obstetrics  Labor Progress Note    Patient Name: Jennifer Hanson  MRN: [id number]  Admission Date: 2019  Hospital Length of Stay: 0 days  Attending Physician: Anna Babb MD  Primary Care Provider: Primary Doctor No    Subjective:     Principal Problem:Indication for care in labor and delivery, antepartum    Hospital Course:  2019 - PROM with clear fluid at 0034. Admitted to L&D, expectant management    Interval History:  Jennifer is a 28 y.o.  at 38w4d. She is doing well. Breathing through contractions, s/o at bedside    Objective:     Vital Signs (Most Recent):  Temp: 98.3 °F (36.8 °C) (19)  Pulse: 96 (19)  Resp: 16 (19)  BP: (!) 115/56 (19)  SpO2: 99 % (19 0440) Vital Signs (24h Range):  Temp:  [97 °F (36.1 °C)-99 °F (37.2 °C)] 98.3 °F (36.8 °C)  Pulse:  [90-99] 96  Resp:  [16-18] 16  SpO2:  [98 %-99 %] 99 %  BP: (106-123)/(56-84) 115/56     Weight: 86.5 kg (190 lb 11.2 oz)  Body mass index is 34.88 kg/m².    FHT: Cat 1 (reassuring)  TOCO:  Q 3-5 minutes    Cervical Exam:  Dilation:  6.5  Effacement:  100%  Station: 0  Presentation: Vertex     Significant Labs:  Lab Results   Component Value Date    GROUPTRH B POS 2019    HEPBSAG Negative 2018    STREPBCULT No Group B Streptococcus isolated 01/10/2019       I have personallly reviewed all pertinent lab results from the last 24 hours.  Recent Lab Results       19  0235        Baso # 0.01     Basophil% 0.1     Differential Method Automated     Eos # 0.0     Eosinophil% 0.3     Gran # (ANC) 4.9     Gran% 65.9     Group & Rh B POS     Hematocrit 38.1     Hemoglobin 12.6     INDIRECT KRISTOPHER NEG     Lymph # 1.9     Lymph% 25.5     MCH 28.3     MCHC 33.1     MCV 85     Mono # 0.6     Mono% 7.9     MPV 11.2     Platelets 331     RBC 4.46     RDW 13.4     WBC 7.49           Physical Exam:   Constitutional: She is oriented to person, place, and time. She  appears well-developed and well-nourished.    HENT:   Head: Normocephalic.     Neck: Normal range of motion.     Pulmonary/Chest: Effort normal.        Abdominal: Soft.     Genitourinary:   Genitourinary Comments: + vaginal discharge, clear amniotic fluid, + bloody show           Musculoskeletal: Normal range of motion.       Neurological: She is alert and oriented to person, place, and time.    Skin: Skin is warm and dry.    Psychiatric: She has a normal mood and affect. Her behavior is normal. Judgment and thought content normal.       Assessment/Plan:     28 y.o. female  at 38w4d for:    * Indication for care in labor and delivery, antepartum    - Admit to L&D  - Reviewed consents together and pt signed (pt also reviewed blood product refusal consent with Anesthesia)  - Anesthesia consult  - Draw labs (CBC and Type & Screen), pt desires 3T labs drawn as well and agreeable to IV start at same time. Saline lock IV.  - Discussed option for augmentation vs expectant management, pt declines augmentation at this time.  - Cephalic per VE and Leopold's  - Intermittent EFM per protocol  - Continue PO hydration - clear liquid diet  -Expectant management       Amniotic fluid leaking    Clear amniotic fluid leaking     Anemia - Fe    Improved secondary to PO FE           Aleksey Swift CNM  Obstetrics  [facility]

## 2019-01-31 NOTE — ASSESSMENT & PLAN NOTE
- Admit to L&D  - Reviewed consents together and pt signed (pt also reviewed blood product refusal consent with Anesthesia)  - Anesthesia consult  - Draw labs (CBC and Type & Screen), pt desires 3T labs drawn as well and agreeable to IV start at same time. Saline lock IV.  - Discussed option for augmentation vs expectant management, pt declines augmentation at this time.  - Cephalic per VE and Leopold's  - Intermittent EFM per protocol  - Continue PO hydration - clear liquid diet

## 2019-01-31 NOTE — HPI
History of Present Illness:   Jennifer Hanson is a 28 y.o. female  at 38w4d with Estimated Date of Delivery: 2/10/19 based on 7wk ultrasound who presents to Labor and Delivery accompanied by her spouse, ZAYNAB. Expecting a surprise gender!    Reports leaking amniotic fluid since 003, with irregular uterine contractions since that started around 0130. They are now 10-15 minutes apart and increasing in intensity and duration.  Mild-moderate contractions, active fetal movement, No vaginal bleeding.     Last ate awilda food at 1800, sipping water at the bedside.     This pregnancy has been complicated by:  Patient Active Problem List   Diagnosis    PCOS (polycystic ovarian syndrome)    Uterine fibroids affecting pregnancy, antepartum    Right ovarian cyst    Anatomy US -- absent fetal nasal bone     Anemia - Fe        Presentation:  Cephalic  Estimated Fetal Weight: 7lb    Birth Center Risk Assessment: 1-management on labor and Delivery (PROM)    0- CNM management in ABC  1- CNM management on L&D  2- Consultation with OB to develop  plan of care  3- Collaborative CNM/OB management with delivery on L&D  4- Permanent referral of care to MD

## 2019-01-31 NOTE — L&D DELIVERY NOTE
[facility]  Vaginal Delivery   Operative Note    SUMMARY     Normal spontaneous vaginal delivery of live infant, was placed on mothers abdomen for skin to skin and bulb suctioning performed.  Infant delivered position OA over intact perineum.  Nuchal cord: No.    Spontaneous delivery of placenta and no pitocin given noting good uterine tone.  No lacerations noted.  Patient tolerated delivery well. Sponge needle and lap counted correctly x2.    Indications:  (spontaneous vaginal delivery)  Pregnancy complicated by:   Patient Active Problem List   Diagnosis    PCOS (polycystic ovarian syndrome)    Uterine fibroids affecting pregnancy, antepartum    Right ovarian cyst    Anatomy US -- absent fetal nasal bone     Anemia - Fe     (spontaneous vaginal delivery)    Delivery outcome of single liveborn infant     Admitting GA: 38w4d    Delivery Information for Matthew Stark    Birth information:  YOB: 2020   Time of birth: 10:38 AM   Sex: female   Head Delivery Date/Time: 2019 10:38 AM   Delivery type: Vaginal, Spontaneous   Gestational Age: 38w4d    Delivery Providers    Delivering clinician:  Aleksey Swift CNM   Provider Role    Ayesha Mireles RN Registered Nurse    [facility]            Measurements    Weight:  3147 g  Length:  49.5 cm  Head circumference:  34.9 cm  Chest circumference:  31.8 cm         Apgars    Living status:  Living  Apgars:   1 min.:   5 min.:   10 min.:   15 min.:   20 min.:     Skin color:   0  1       Heart rate:   2  2       Reflex irritability:   2  2       Muscle tone:   2  2       Respiratory effort:   1  2       Total:   7  9       Apgars assigned by:  CHAPITO MIRELES RN         Operative Delivery    Forceps attempted?:  No  Vacuum extractor attempted?:  No         Shoulder Dystocia    Shoulder dystocia present?:  No           Presentation    Presentation:  Vertex  Position:  Occiput Anterior            Interventions/Resuscitation    Method:  Bulb Suctioning, Tactile Stimulation       Cord    Vessels:  3 vessels  Delayed Cord Clamping?:  Yes  Cord Blood Disposition:  Sent with Baby  Gases Sent?:  No  Stem Cell Collection (by MD):  No       Placenta    Placenta delivery date/time:  2019 1045  Placenta removal:  Spontaneous  Placenta appearance:  Intact  Placenta disposition:  discarded           Labor Events:       labor: No     Labor Onset Date/Time:         Dilation Complete Date/Time:         Start Pushing Date/Time:       Rupture Date/Time: 19  0034         Rupture type: spontaneous rupture of membranes, leaking         Fluid Amount: moderate      Fluid Color:        Fluid Odor:        Membrane Status (PeriCalm): SRM (Spontaneous Rupture)      Rupture Date/Time (PeriCalm): 2019 00:34:00      Fluid Amount (PeriCalm):        Fluid Color (PeriCalm): Clear       steroids: None     Antibiotics given for GBS: No     Induction: none     Indications for induction:        Augmentation:       Indications for augmentation:       Labor complications: None     Additional complications:          Cervical ripening:                     Delivery:      Episiotomy: None     Indication for Episiotomy:       Perineal Lacerations: None Repaired:      Periurethral Laceration: none Repaired:     Labial Laceration: none Repaired:     Sulcus Laceration: none Repaired:     Vaginal Laceration: No Repaired:     Cervical Laceration: No Repaired:     Repair suture: None     Repair # of packets:       Vaginal delivery QBL (mL): 300      QBL (mL): 0     Combined Blood Loss (mL): 300     Vaginal Sweep Performed: No     Surgicount Correct:         Other providers:       Anesthesia    Method:  None          Details (if applicable):  Trial of Labor      Categorization:      Priority:     Indications for :     Incision Type:       Additional  information:  Forceps:     Vacuum:    Breech:    Observed anomalies    Other (Comments):

## 2019-01-31 NOTE — SUBJECTIVE & OBJECTIVE
Obstetric HPI:  Patient coping well with UCs, able to talk and walk through them, but aware of them.  Continued LOF. Declines pain management at this time (desires an unmedicated birth).    Obstetric History       T0      L0     SAB0   TAB0   Ectopic0   Multiple0   Live Births0       # Outcome Date GA Lbr Kenneth/2nd Weight Sex Delivery Anes PTL Lv   1 Current                 Past Medical History:   Diagnosis Date    Abnormal cervical Papanicolaou smear     repeat paps    PCOS (polycystic ovarian syndrome)     Metformin late , stopped 2018    Prenatal care in second trimester 10/15/2018     Past Surgical History:   Procedure Laterality Date    KNEE SURGERY      WISDOM TOOTH EXTRACTION         PTA Medications   Medication Sig    docosahexanoic acid (DHA PRENATAL ORAL) Take by mouth.    ferrous sulfate (FEOSOL) 325 mg (65 mg iron) Tab tablet Take 1 tablet (325 mg total) by mouth once daily.    pantoprazole (PROTONIX) 40 MG tablet Take 1 tablet (40 mg total) by mouth once daily.    prenatal 48-iron-folic acid-B6 (CITRANATAL B-CALM, FE GLUC,) 20 mg iron-1 mg -25 mg/25 mg TbSQ Take 1 tablet by mouth once daily.       Review of patient's allergies indicates:   Allergen Reactions    Pcn [penicillins] Other (See Comments)     Uncertain of reaction - just told from her mother that since she was little she is allergic        Family History     Problem Relation (Age of Onset)    Cataracts Maternal Grandmother    Hypertension Maternal Grandmother    Lung cancer Paternal Grandfather    No Known Problems Father, Sister, Brother, Maternal Aunt, Maternal Uncle, Paternal Aunt, Paternal Uncle, Paternal Grandmother    Stroke Maternal Grandfather    Thyroid disease Mother        Tobacco Use    Smoking status: Never Smoker    Smokeless tobacco: Never Used   Substance and Sexual Activity    Alcohol use: Yes    Drug use: No     Comment: None with pregnancy    Sexual activity: Yes     Partners: Female      Birth control/protection: None     Comment: Partner J     Review of Systems   Constitutional: Negative for activity change, chills, fever and unexpected weight change.   Eyes: Negative for visual disturbance.   Respiratory: Negative for cough and shortness of breath.    Cardiovascular: Negative for chest pain.   Gastrointestinal: Positive for abdominal pain (Cramping). Negative for constipation, diarrhea, nausea and vomiting.   Genitourinary: Positive for vaginal discharge (Clear fluid). Negative for dysuria, genital sores, vaginal bleeding and vaginal odor.   Neurological: Negative for syncope and headaches.   Psychiatric/Behavioral: Negative for depression.      Objective:     Vital Signs (Most Recent):  Temp: 99 °F (37.2 °C) (01/31/19 0552)  Pulse: 93 (01/31/19 0440)  Resp: 18 (01/31/19 0440)  BP: 118/68 (01/31/19 0440)  SpO2: 99 % (01/31/19 0440) Vital Signs (24h Range):  Temp:  [97 °F (36.1 °C)-99 °F (37.2 °C)] 99 °F (37.2 °C)  Pulse:  [90-99] 93  Resp:  [18] 18  SpO2:  [98 %-99 %] 99 %  BP: (106-123)/(62-84) 118/68     Weight: 86.5 kg (190 lb 11.2 oz)  Body mass index is 34.88 kg/m².    FHT:  Baseline 145, moderate BTBV, positive accels, no decels. Cat 1 (reassuring)  TOCO:  Q 5-10 minutes    Physical Exam:   Constitutional: She is oriented to person, place, and time. She appears well-developed and well-nourished.    HENT:   Head: Normocephalic and atraumatic.     Neck: Normal range of motion.    Cardiovascular: Normal rate and regular rhythm.     Pulmonary/Chest: Effort normal. No respiratory distress.        Abdominal: Soft. Distention: Gravid. There is no tenderness. There is no guarding.             Musculoskeletal: Normal range of motion and moves all extremeties.       Neurological: She is alert and oriented to person, place, and time.    Skin: Skin is warm, dry and intact. No rash noted. No erythema.    Psychiatric: She has a normal mood and affect. Her behavior is normal.       Cervix: Per SVE in  OB ED  Dilation:  4  Effacement:  70%  Station: -2  Presentation: Vertex     Significant Labs:  Lab Results   Component Value Date    GROUPTRH B POS 01/31/2019    HEPBSAG Negative 06/26/2018    STREPBCULT No Group B Streptococcus isolated 01/10/2019       I have personallly reviewed all pertinent lab results from the last 24 hours.

## 2019-02-01 PROCEDURE — 25000003 PHARM REV CODE 250: Performed by: ADVANCED PRACTICE MIDWIFE

## 2019-02-01 PROCEDURE — 25000003 PHARM REV CODE 250: Performed by: MIDWIFE

## 2019-02-01 PROCEDURE — 11000001 HC ACUTE MED/SURG PRIVATE ROOM

## 2019-02-01 RX ORDER — DOCUSATE SODIUM 100 MG/1
100 CAPSULE, LIQUID FILLED ORAL 2 TIMES DAILY PRN
Status: DISCONTINUED | OUTPATIENT
Start: 2019-02-01 | End: 2019-02-02 | Stop reason: HOSPADM

## 2019-02-01 RX ADMIN — IBUPROFEN 600 MG: 600 TABLET ORAL at 12:02

## 2019-02-01 RX ADMIN — HYDROCODONE BITARTRATE AND ACETAMINOPHEN 1 TABLET: 5; 325 TABLET ORAL at 06:02

## 2019-02-01 RX ADMIN — DOCUSATE SODIUM 100 MG: 100 CAPSULE, LIQUID FILLED ORAL at 09:02

## 2019-02-01 RX ADMIN — IBUPROFEN 600 MG: 600 TABLET ORAL at 11:02

## 2019-02-01 RX ADMIN — IBUPROFEN 600 MG: 600 TABLET ORAL at 06:02

## 2019-02-01 NOTE — NURSING
Pt needs maximum assist during breastfeeding. RN unable to obtain drops during multiple hand expression sessions with pt. Pt started on pump @ 2250 per LC note due to difficulty latching and hand expressing. Will continue to monitor.

## 2019-02-01 NOTE — PROGRESS NOTES
[facility]  Obstetrics  Postpartum Progress Note    Patient Name: Jennifer Stark  MRN: [id number]  Admission Date: 2019  Hospital Length of Stay: 1 days  Attending Physician: Anna Babb MD  Primary Care Provider: Primary Doctor No    Subjective:     Principal Problem: (spontaneous vaginal delivery)    Hospital course:   2019 - PROM with clear fluid at 0034. Admitted to L&D, expectant management, pt desires to get in tub  19--pt progressed to , no complications, routine PP care      Interval History: PPD #1 s/p , doing well, struggling with breastfeeding as baby has been sleepy and not latching consistently. Encouragement offered.    She is doing well this morning. She is tolerating a regular diet without nausea or vomiting. She is voiding spontaneously. Some burning with urination, improved when using the spray bottle and hurricane spray. She is ambulating. She has passed flatus, and has not a BM. Vaginal bleeding is mild. She denies fever or chills. Abdominal pain is mild and controlled with oral medications. She is breastfeeding. She desires circumcision for her male baby: not applicable.     Objective:     Vital Signs (Most Recent):  Temp: 98.2 °F (36.8 °C) (19 0800)  Pulse: 89 (19 0800)  Resp: 18 (19 0800)  BP: 113/60 (19 0800)  SpO2: 97 % (19 0800) Vital Signs (24h Range):  Temp:  [98.2 °F (36.8 °C)-98.4 °F (36.9 °C)] 98.2 °F (36.8 °C)  Pulse:  [89-95] 89  Resp:  [18] 18  SpO2:  [97 %-98 %] 97 %  BP: ()/(56-60) 113/60     Weight: 86.5 kg (190 lb 11.2 oz)  Body mass index is 34.88 kg/m².      Intake/Output Summary (Last 24 hours) at 2019 1446  Last data filed at 2019 1800  Gross per 24 hour   Intake --   Output 700 ml   Net -700 ml         General:   alert, appears stated age, cooperative and no distress   Lungs:   normal chest rise, nonlabored respirations   Heart:   normal pulse, normal HR   Abdomen:  soft,  nontender fundus firm at u   Uterus:  firm located at the umblicus.        Extremities: No edema, FROM, negative homans bilaterally.       Significant Labs:  Lab Results   Component Value Date    GROUPTRH B POS 2019    HEPBSAG Negative 2018    STREPBCULT No Group B Streptococcus isolated 01/10/2019     Recent Labs   Lab 19  0235   HGB 12.6   HCT 38.1       None    Assessment/Plan:     28 y.o. female  at 38w4d for:    Active Diagnoses:    Diagnosis Date Noted POA    PRINCIPAL PROBLEM:   (spontaneous vaginal delivery) [O80] 2019 Not Applicable    Delivery outcome of single liveborn infant [Z37.0] 2019 Not Applicable    Anemia - Fe [D64.9] 2018 Yes    Anatomy US -- absent fetal nasal bone  [Q30.8] 10/16/2018 Not Applicable      Problems Resolved During this Admission:    Diagnosis Date Noted Date Resolved POA    Amniotic fluid leaking [O42.90] 2019 Yes    Indication for care in labor and delivery, antepartum [O75.9] 2019 Yes       Breastfeeding difficulty with latch---encouragement offered, lactation working with patient as well.    Disposition: As patient meets milestones, will plan to discharge 18.    Gardenia Singh CNM  Obstetrics  [facility]

## 2019-02-01 NOTE — LACTATION NOTE
"Pt states infant "not latching well." LC assisted pt to stimulate baby, and latch to BL breasts in cr-cr and FB. Infant latched and sucked actively for 1-3 minutes each side, then falls asleep, requires constant stim and breast compression. LC assisted pt to hand express, obtained a few drops, supplemented to baby with gloved finger. LC assisted pt to place warm compresses and pump BL breasts. LC instructed pt on "the plan." All questions answered, verbalized understanding.       02/01/19 1110   Maternal Infant Feeding   Maternal Emotional State assist needed   Infant Positioning cross-cradle;clutch/football   Signs of Milk Transfer infant jaw motion present   Pain with Feeding yes   Pain Location nipples, bilateral   Pain Description pulling   Latch Assistance yes   Breast Pumping   Breast Pumping Interventions frequent pumping encouraged;post-feed pumping encouraged   Breast Pumping bilateral breasts pumped until soft;pre-pumping breast massage;double electric breast pump utilized     "

## 2019-02-01 NOTE — DISCHARGE INSTRUCTIONS
Breastfeeding Discharge Instructions       Feed the baby at the earliest sign of hunger or comfort  o Hands to mouth, sucking motions  o Rooting or searching for something to suck on  o Dont wait for crying - it is a sign of distress     The feedings may be 8-12 times per 24hrs and will not follow a schedule   Avoid pacifiers and bottles for the first 4 weeks   Alternate the breast you start the feeding with, or start with the breast that feels the fullest   Switch breasts when the baby takes himself off the breast or falls asleep   Keep offering breasts until the baby looks full, no longer gives hunger signs, and stays asleep when placed on his back in the crib   If the baby is sleepy and wont wake for a feeding, put the baby skin-to-skin dressed in a diaper against the mothers bare chest   Sleep near your baby   The baby should be positioned and latched on to the breast correctly  o Chest-to-chest, chin in the breast  o Babys lips are flipped outward  o Babys mouth is stretched open wide like a shout  o Babys sucking should feel like tugging to the mother  - The baby should be drinking at the breast:  o You should hear swallowing or gulping throughout the feeding  o You should see milk on the babys lips when he comes off the breast  o Your breasts should be softer when the baby is finished feeding  o The baby should look relaxed at the end of feedings  o After the 4th day and your milk is in:  o The babys poop should turn bright yellow and be loose, watery, and seedy  o The baby should have at least 3-4 poops the size of the palm of your hand per day  o The baby should have at least 5-6 wet diapers per day  o The urine should be light yellow in color  You should drink when you are thirsty and eat a healthy diet when you are    hungry.     Take naps to get the rest you need.   Take medications and/or drink alcohol only with permission of your obstetrician    or the babys pediatrician.  You can  also call the Infant Risk Center,   ([phone]), Monday-Friday, 8am-5pm Central time, to get the most   up-to-date evidence-based information on the use of medications during   pregnancy and breastfeeding.      The baby should be examined by a pediatrician at 3-5 days of age.  Once your   milk comes in, the baby should be gaining at least ½ - 1oz each day and should be back to birthweight no later than 10-14 days of age.          Community Resources    [facility] Breastfeeding Warmline: [phone]   Local [facility] clinics: provide incentives and breastpumps to eligible mothers  [employer] (LLLI):  mother-to-mother support group website        www.Cash4[employer]l.CloudFX  Local La Leche League mother-to-mother support groups:        www.[employer]        La Leche League [facility]   Dr. Amari Funes website for latch videos and general information:        www.breastfeedinginc.ca  Infant Risk Center is a call center that provides information about the safety of taking medications while breastfeeding.  Call [phone], M-F, 8am-5pm, [place].  International Lactation Consultant Association provides resources for assistance:        www.ilca.org  [place] Breastfeeding Coalition provides informationand resources for parents  and the community    www.LaBreastfeedingSupport.org     Rosanne Weldon is a mom-to-mom support group:                             www.[email or web address].[email or web address]//breastfeedng-support/  Partners for Healthy Babies:  7-305-418-BABY(7951)  [employer]: a breastfeeding support group for women of color, [phone]

## 2019-02-01 NOTE — LACTATION NOTE
01/31/19 1715   Maternal Assessment   Breast Shape Bilateral:;pendulous   Breast Density Bilateral:;soft   Areola Bilateral:;elastic   Nipples Right:;inverted;Left:;flat   Maternal Infant Feeding   Maternal Emotional State assist needed   Infant Positioning clutch/football   Signs of Milk Transfer infant jaw motion present   Pain with Feeding no   Latch Assistance yes   Breast Pumping   Breast Pumping other (see comments)  (hand expression encouraged)   Basic lactation education. Assisted with waking infant, several attempts to latch, infant took a few good tugs, sleepy. Hand expressed drops after infant stimulated mom on the left side. Maximum effort to hand express. Encouraged STS and possibility to start breast pump overnight if infant unable to latch or hand express. Mother has breast pump for home use. Breast shells given and educated on use.

## 2019-02-01 NOTE — PLAN OF CARE
Problem: Breastfeeding  Goal: Effective Breastfeeding  Outcome: Ongoing (interventions implemented as appropriate)  Mother to breastfeed infant 8 or more times in 24hrs on infant's cue until content, frequent skin to skin, and will avoid bottles and pacifiers.  Mother is to keep infant actively feeding by keeping infant stimulated and using breast compression. Mother ensure effective nursing by hearing infant swallows and feeling nice tugs and pulls. Latch should not be painful while nursing.  Mother to record all breastfeedings, voids and stools in breastfeeding guide. Mother to call  for breastfeeding assistance or questions .  Refer breastfeeding guide for lactation education.

## 2019-02-02 VITALS
HEART RATE: 72 BPM | BODY MASS INDEX: 35.09 KG/M2 | RESPIRATION RATE: 18 BRPM | TEMPERATURE: 98 F | SYSTOLIC BLOOD PRESSURE: 109 MMHG | HEIGHT: 62 IN | DIASTOLIC BLOOD PRESSURE: 58 MMHG | WEIGHT: 190.69 LBS | OXYGEN SATURATION: 97 %

## 2019-02-02 PROCEDURE — 25000003 PHARM REV CODE 250: Performed by: MIDWIFE

## 2019-02-02 PROCEDURE — 72200004 HC VAGINAL DELIVERY LEVEL I

## 2019-02-02 PROCEDURE — 99238 HOSP IP/OBS DSCHRG MGMT 30/<: CPT | Mod: ,,, | Performed by: ADVANCED PRACTICE MIDWIFE

## 2019-02-02 PROCEDURE — 99238 PR HOSPITAL DISCHARGE DAY,<30 MIN: ICD-10-PCS | Mod: ,,, | Performed by: ADVANCED PRACTICE MIDWIFE

## 2019-02-02 RX ORDER — IBUPROFEN 600 MG/1
600 TABLET ORAL EVERY 6 HOURS PRN
Qty: 30 TABLET | Refills: 0 | Status: SHIPPED | OUTPATIENT
Start: 2019-02-02 | End: 2021-02-03

## 2019-02-02 RX ADMIN — IBUPROFEN 600 MG: 600 TABLET ORAL at 07:02

## 2019-02-02 RX ADMIN — HYDROCORTISONE 2.5%: 25 CREAM TOPICAL at 07:02

## 2019-02-02 NOTE — LACTATION NOTE
02/02/19 1020   Maternal Assessment   Breast Shape Bilateral:;pendulous   Breast Density Bilateral:;soft   Areola Bilateral:;elastic   Nipples Bilateral:;graspable   Maternal Infant Feeding   Maternal Emotional State assist needed   Infant Positioning clutch/football   Signs of Milk Transfer audible swallow;infant jaw motion present   Pain with Feeding yes   Pain Location nipples, bilateral  (tenderness)   Pain Description soreness   Nipple Shape After Feeding, Left round    Nipple Shape After Feeding, Right round   Latch Assistance yes  (with nipple shield)   Equipment Type   Breast Pump Type double electric, hospital grade   Breast Pump Flange Type hard   Breast Pump Flange Size 24 mm;27 mm  (L-24 R- 27)   Breast Pumping   Breast Pumping Interventions post-feed pumping encouraged;frequent pumping encouraged   Breast Pumping bilateral breasts pumped until soft;double electric breast pump utilized   Lactation Referrals   Lactation Referrals other (see comments)  (lactation warmline)   Discharge lactation education reviewed with breastfeeding guide. Assisted with position and latch in FB on both breasts, unable to latch or sustain without nipple shield, infant sustained with shield with lots of breast compression and stimulation to keep active. Good tugs/pulls, audible swallows. Taught partner how to assist with feedings. Encouraged to pump post feedings 4-5x/day if using shield every feeding. Engorgement measures reviewed. Mother using hydrogels for healing, encouraged to use breast shells and lanolin or EBM on nipples once milk volume increases to make latching easier. Reviewed pump use, parts, cleaning, sterilizing daily, milk storage/handling; shells and hydrogels and shield use and care. Mother has lactation number for after discharge and is aware off additional resources. Mother has pump at home. DIscharge weight down 5.3%, following up Monday with ped.

## 2019-02-02 NOTE — PROGRESS NOTES
[facility]  Obstetrics  Postpartum Progress Note    Patient Name: Jennifer Stark  MRN: [id number]  Admission Date: 2019  Hospital Length of Stay: 2 days  Attending Physician: Anna Babb MD  Primary Care Provider: Primary Doctor No    Subjective:     Principal Problem: (spontaneous vaginal delivery)    Hospital course: 2019 - PROM with clear fluid at 0034. Admitted to L&D, expectant management, pt desires to get in tub --pt progressed to , no complications. Female infant.  2019 - PPD#1, routine PP care  2019 - PPD#2, desires discharge    Interval History:   Doing well, ambulating, voiding, and tolerating regular diet  Lochia: steadily decreasing  Pain: well controlled occasionally requiring PO Ibuprofen medication  Breasts/nipples: breast feeding well with some difficulty; nipple shield initiated and pt pumping following feeds. Has seen lactation  Depression/anxiety: Neg   Support at home: Good  Contraception: considering options; desires to resume the Patch at postpartum F/U appt.  [place]: infant daughter is doing well, will f/u with pediatrician       Objective:     Vital Signs (Most Recent):  Temp: 98.4 °F (36.9 °C) (19)  Pulse: 72 (19 07)  Resp: 18 (19)  BP: (!) 109/58 (19)  SpO2: 97 % (19) Vital Signs (24h Range):  Temp:  [98.3 °F (36.8 °C)-99.6 °F (37.6 °C)] 98.4 °F (36.9 °C)  Pulse:  [72-93] 72  Resp:  [18] 18  SpO2:  [97 %-99 %] 97 %  BP: (109-114)/(58-72) 109/58     Weight: 86.5 kg (190 lb 11.2 oz)  Body mass index is 34.88 kg/m².    No intake or output data in the 24 hours ending 19 1109    Significant Labs:  Lab Results   Component Value Date    GROUPTRH B POS 2019    HEPBSAG Negative 2018    STREPBCULT No Group B Streptococcus isolated 01/10/2019     No results for input(s): HGB, HCT in the last 48 hours.    I have personallly reviewed all pertinent lab results from the last 24  hours.    Physical Exam   Gen: A&O x 4, NAD  CV: normal HR  Lungs: normal resp effort  Breasts: bilaterally soft, non-tender, nipples intact bilaterally  Abdomen: soft, non-tender, uterus firm at U - 2 fb  Perineum: approximated, no edema   Lochia: minimal rubra  Ext: bilaterally without pedal edema, without signs of DVT        Assessment/Plan:     28 y.o. female  for:    *  (spontaneous vaginal delivery)    Postpartum Day #2 - normal involution.   PP Contraception: extensive discussion regarding patch vs progesterone-only options. Reviewed risks/benefits to both. She will consider and let us know at routine PP F/U appt, pending milk supply/breast feeding at that time.  Discharge education completed     Breast feeding status of mother    Continue lactation consults PRN while inpatient. Reviewed contact info following discharge  Reviewed warning s/s.     Anemia - Fe    Continue PO FE         Disposition: As patient meets milestones, will plan to discharge today.    Sade Prabhakar CNM  Obstetrics  [facility]

## 2019-02-02 NOTE — SUBJECTIVE & OBJECTIVE
Hospital course: 2019 - PROM with clear fluid at 0034. Admitted to L&D, expectant management, pt desires to get in tub --pt progressed to , no complications. Female infant.  2019 - PPD#1, routine PP care  2019 - PPD#2, desires discharge    Interval History:   Doing well, ambulating, voiding, and tolerating regular diet  Lochia: steadily decreasing  Pain: well controlled occasionally requiring PO Ibuprofen medication  Breasts/nipples: breast feeding well with some difficulty; nipple shield initiated and pt pumping following feeds. Has seen lactation  Depression/anxiety: Neg   Support at home: Good  Contraception: considering options; desires to resume the Patch at postpartum F/U appt.  : infant daughter is doing well, will f/u with pediatrician       Objective:     Vital Signs (Most Recent):  Temp: 98.4 °F (36.9 °C) (19)  Pulse: 72 (19)  Resp: 18 (19)  BP: (!) 109/58 (19)  SpO2: 97 % (19) Vital Signs (24h Range):  Temp:  [98.3 °F (36.8 °C)-99.6 °F (37.6 °C)] 98.4 °F (36.9 °C)  Pulse:  [72-93] 72  Resp:  [18] 18  SpO2:  [97 %-99 %] 97 %  BP: (109-114)/(58-72) 109/58     Weight: 86.5 kg (190 lb 11.2 oz)  Body mass index is 34.88 kg/m².    No intake or output data in the 24 hours ending 19 1109    Significant Labs:  Lab Results   Component Value Date    GROUPTRH B POS 2019    HEPBSAG Negative 2018    STREPBCULT No Group B Streptococcus isolated 01/10/2019     No results for input(s): HGB, HCT in the last 48 hours.    I have personallly reviewed all pertinent lab results from the last 24 hours.    Physical Exam   Gen: A&O x 4, NAD  CV: normal HR  Lungs: normal resp effort  Breasts: bilaterally soft, non-tender, nipples intact bilaterally  Abdomen: soft, non-tender, uterus firm at U - 2 fb  Perineum: approximated, no edema   Lochia: minimal rubra  Ext: bilaterally without pedal edema, without signs of DVT

## 2019-02-02 NOTE — DISCHARGE SUMMARY
[facility]  Obstetrics  Discharge Summary      Patient Name: Jennifer Stark  MRN: [id number]  Admission Date: 2019  Hospital Length of Stay: 2 days  Discharge Date and Time:  2019 11:35 AM  Attending Physician: Anna Babb MD   Discharging Provider: Sade Prabhakar CNM  Primary Care Provider: Primary Doctor No    HPI:   History of Present Illness:   Jennifer Hanson is a 28 y.o. female  at 38w4d with Estimated Date of Delivery: 2/10/19 based on 7wk ultrasound who presents to Labor and Delivery accompanied by her spouse, ZAYNAB. Expecting a surprise gender!    Reports leaking amniotic fluid since 0034, with irregular uterine contractions since that started around 0130. They are now 10-15 minutes apart and increasing in intensity and duration.  Mild-moderate contractions, active fetal movement, No vaginal bleeding.     Last ate [place] food at 1800, sipping water at the bedside.     This pregnancy has been complicated by:  Patient Active Problem List   Diagnosis    PCOS (polycystic ovarian syndrome)    Uterine fibroids affecting pregnancy, antepartum    Right ovarian cyst    Anatomy US -- absent fetal nasal bone     Anemia - Fe        Presentation:  Cephalic  Estimated Fetal Weight: 7lb    Birth Center Risk Assessment: 1-management on labor and Delivery (PROM)    0- CNM management in ABC  1- CNM management on L&D  2- Consultation with OB to develop  plan of care  3- Collaborative CNM/OB management with delivery on L&D  4- Permanent referral of care to MD        * No surgery found *     Hospital Course:   2019 - PROM with clear fluid at 0034. Admitted to L&D, expectant management, pt desires to get in tub --pt progressed to , no complications. Female infant.  2019 - PPD#1, routine PP care  2019 - PPD#2, desires discharge    Consults (From admission, onward)        Status Ordering Provider     Inpatient consult to Anesthesiology  Once     Provider:  (Not yet  assigned)    Acknowledged DAMIAN GOMES          Final Active Diagnoses:    Diagnosis Date Noted POA    PRINCIPAL PROBLEM:   (spontaneous vaginal delivery) [O80] 2019 Not Applicable    Breast feeding status of mother [Z39.1] 2019 Not Applicable    Delivery outcome of single liveborn infant [Z37.0] 2019 Not Applicable    Anemia - Fe [D64.9] 2018 Yes      Problems Resolved During this Admission:    Diagnosis Date Noted Date Resolved POA    Indication for care in labor and delivery, antepartum [O75.9] 2019 Yes    Amniotic fluid leaking [O42.90] 2019 Yes        Labs: CBC No results for input(s): WBC, HGB, HCT, PLT in the last 48 hours.    Feeding Method: breast    Immunizations     None          Delivery:    Episiotomy: None   Lacerations: None   Repair suture: None   Repair # of packets:     Blood loss (ml): 300     Birth information:  YOB: 2020   Time of birth: 10:38 AM   Sex: female   Delivery type: Vaginal, Spontaneous   Gestational Age: 38w4d    Delivery Clinician:      Other providers:       Additional  information:  Forceps:    Vacuum:    Breech:    Observed anomalies      Living?:           APGARS  One minute Five minutes Ten minutes   Skin color:         Heart rate:         Grimace:         Muscle tone:         Breathing:         Totals: 7  9        Placenta: Delivered:       appearance    Pending Diagnostic Studies:     None          Discharged Condition: stable    Disposition: Home or Self Care    Follow Up:  Follow-up Information     Rosa Wheat CNM In 6 weeks.    Specialty:  Obstetrics and Gynecology  Why:  routine postpartum appt  Contact information:  [address]  [facility] [address] [phone]                 Patient Instructions:      Diet Adult Regular     Ice to affected area     Lifting restrictions     Notify your health care provider if you experience any of the following:  temperature >100.4      Notify your health care provider if you experience any of the following:  persistent nausea and vomiting or diarrhea     Notify your health care provider if you experience any of the following:  severe uncontrolled pain     Notify your health care provider if you experience any of the following:  redness, tenderness, or signs of infection (pain, swelling, redness, odor or green/yellow discharge around incision site)     Notify your health care provider if you experience any of the following:  difficulty breathing or increased cough     Notify your health care provider if you experience any of the following:  severe persistent headache     Notify your health care provider if you experience any of the following:  worsening rash     Notify your health care provider if you experience any of the following:  persistent dizziness, light-headedness, or visual disturbances     Notify your health care provider if you experience any of the following:  increased confusion or weakness     Activity as tolerated     Medications:  Current Discharge Medication List      START taking these medications    Details   ibuprofen (ADVIL,MOTRIN) 600 MG tablet Take 1 tablet (600 mg total) by mouth every 6 (six) hours as needed (cramping).  Qty: 30 tablet, Refills: 0         CONTINUE these medications which have NOT CHANGED    Details   ferrous sulfate (FEOSOL) 325 mg (65 mg iron) Tab tablet Take 1 tablet (325 mg total) by mouth once daily.  Qty: 30 tablet, Refills: 3      prenatal 48-iron-folic acid-B6 (CITRANATAL B-CALM, FE GLUC,) 20 mg iron-1 mg -25 mg/25 mg TbSQ Take 1 tablet by mouth once daily.  Qty: 30 tablet, Refills: 9         STOP taking these medications       docosahexanoic acid (DHA PRENATAL ORAL) Comments:   Reason for Stopping:         pantoprazole (PROTONIX) 40 MG tablet Comments:   Reason for Stopping:               Follow Up/Patient Instructions:   1. Reviewed postpartum recommendations and precautions ~ encouraged to  call for fever, severe or increased pain in head, chest, abdomen, perineum or legs; heavy bleeding, foul smelling lochia, signs of depression, or any other concern. Reviewed postpartum precautions r/t high blood pressure ~ encouraged to call for HA, vision changes, epigastric discomfort, or abnormal swelling.  2. Rx provided: Ibuprofen  3. Contraception: considering options; will f/u at postpartum visit. Encouraged abstinence and pelvic rest for healing until after postpartum check-up.   4. Encouraged to continue PNV while breastfeeding or at least for 6 weeks postpartum. Continue Fe.  5. Car seat law will be reviewed with patient at discharge per protocol  6. RTO in 4-6 weeks or sooner prn.  7. Discharge home today with written and verbal postpartum instructions and precautions.         Sade Prabhakar CNM  Obstetrics  [facility]

## 2019-02-02 NOTE — ASSESSMENT & PLAN NOTE
Continue lactation consults PRN while inpatient. Reviewed contact info following discharge  Reviewed warning s/s.

## 2019-02-02 NOTE — PLAN OF CARE
Problem: Breastfeeding  Goal: Effective Breastfeeding  Outcome: Outcome(s) achieved Date Met: 02/02/19  Mother to breastfeed infant 8 or more times in 24hrs on infant's cue until content, frequent skin to skin, and will avoid bottles and pacifiers.  Mother is to keep infant actively feeding by keeping infant stimulated and using breast compression. Mother ensure effective nursing by hearing infant swallows and feeling nice tugs and pulls. Latch should not be painful while nursing.  Mother to record all breastfeedings, voids and stools in breastfeeding guide. Mother to call  for breastfeeding assistance or questions .  Refer breastfeeding guide for lactation education.     Mother to nurse with nipple shield, pump 4-5x/day.

## 2019-02-02 NOTE — ASSESSMENT & PLAN NOTE
Postpartum Day #2 - normal involution.   PP Contraception: extensive discussion regarding patch vs progesterone-only options. Reviewed risks/benefits to both. She will consider and let us know at routine PP F/U appt, pending milk supply/breast feeding at that time.  Discharge education completed

## 2019-02-04 ENCOUNTER — TELEPHONE (OUTPATIENT)
Dept: LACTATION | Facility: CLINIC | Age: 29
End: 2019-02-04

## 2019-02-04 NOTE — TELEPHONE ENCOUNTER
Pt experiencing engorgement. Pt has been using warm compresses and pumping. Baby is able to latch to right breast with shield but seems frustrated. Baby will nurse for 20-30 minutes and she will express 10 mls. Encouraged pt to use cool/ice compresses for 10 minutes to decrease swelling and then use breastpump for 20-30 minutes. Baby had ped visit today and weight was 6#8oz. Praise provided. Reviewed diaper counts and encouragement care. Pt aware to call for any further assistance.

## 2019-02-06 NOTE — PHYSICIAN QUERY
"PT Name: Jennifer Stark  MR #: [id number]    Physician Query Form - Hematology Clarification      CDS/: JUANPABLO Krueger,RNC-[email or web address]         Contact information:[email or web address]    This form is a permanent document in the medical record.      Query Date: February 6, 2019    By submitting this query, we are merely seeking further clarification of documentation. Please utilize your independent clinical judgment when addressing the question(s) below.    The Medical record contains the following:   Indicators  Supporting Clinical Findings Location in Medical Record   X "Anemia" documented Anemia-Fe OB Progress note 1/31@836am   X H & H = Hgb=12.6  Hct=38.1 LAB 1/31    BP =                     HR=      "GI bleeding" documented      Acute bleeding (Non GI site)      Transfusion(s)     X Treatment: PO FE OB Progress note 1/31@836am    Other:        Provider, please specify diagnosis or diagnoses associated with above clinical findings.    [x] Iron deficiency anemia     [  ] Other Hematological Diagnosis (please specify):     [  ] Clinically Undetermined       Please document in your progress notes daily for the duration of treatment, until resolved, and include in your discharge summary.                                                                                                      "

## 2019-02-07 ENCOUNTER — TELEPHONE (OUTPATIENT)
Dept: LACTATION | Facility: CLINIC | Age: 29
End: 2019-02-07

## 2019-02-07 NOTE — TELEPHONE ENCOUNTER
"Follow up phone call. Using nipple shield and feeding infant mostly on L breast. Infant can latch on R but gets frustrated because "nothing is coming out." Milk visible in shield when unlatches on L breast. Mother is using [place] pump when "on the go." She supplemented baby with 1.5oz today.  She uses Spectra pump on the R breast and getting "10mls." Encouraged mom to double pump both breasts with Spectra pump using warm compresses & breast massage after each feeding. Mother reports infant is having 4-5 voids, clear in color, but only 1 small stool in last 24hrs. Encouraged mother to wake to nurse, 8 or more times in 24hrs, then pump and supplement with EBM until weight check on Monday with pediatrician. Mother reports she is still having to wake infant for most feedings. Encouraged to monitor output and notify ped if no stool overnight. Mother has lactation number for any further assistance.   "

## 2019-02-09 ENCOUNTER — TELEPHONE (OUTPATIENT)
Dept: LACTATION | Facility: CLINIC | Age: 29
End: 2019-02-09

## 2019-02-11 ENCOUNTER — TELEPHONE (OUTPATIENT)
Dept: OBSTETRICS AND GYNECOLOGY | Facility: OTHER | Age: 29
End: 2019-02-11

## 2019-02-12 ENCOUNTER — TELEPHONE (OUTPATIENT)
Dept: OBSTETRICS AND GYNECOLOGY | Facility: OTHER | Age: 29
End: 2019-02-12

## 2019-02-12 NOTE — TELEPHONE ENCOUNTER
The patient states she is doing well since being discharged. She states her pain is decreasing daily. The patient does complain of generalized body aches; states she did not get flu vaccine; states she does not have any other symptoms. The patient states she went for a lactation appointment and she was checked for mastitis. She states she is now exclusively breastpumping and bottle feeding because the infant has not gained the preferred weight via the pediatrician. The patient was referred to contact her provider regarding any symptoms she experiences for further evaluation. The patient states she had a good experience at [facility].

## 2019-03-11 ENCOUNTER — POSTPARTUM VISIT (OUTPATIENT)
Dept: OBSTETRICS AND GYNECOLOGY | Facility: CLINIC | Age: 29
End: 2019-03-11
Payer: MEDICAID

## 2019-03-11 VITALS — SYSTOLIC BLOOD PRESSURE: 100 MMHG | BODY MASS INDEX: 34.88 KG/M2 | HEIGHT: 62 IN | DIASTOLIC BLOOD PRESSURE: 60 MMHG

## 2019-03-11 PROCEDURE — 99999 PR PBB SHADOW E&M-EST. PATIENT-LVL II: ICD-10-PCS | Mod: PBBFAC,,, | Performed by: ADVANCED PRACTICE MIDWIFE

## 2019-03-11 PROCEDURE — 99999 PR PBB SHADOW E&M-EST. PATIENT-LVL II: CPT | Mod: PBBFAC,,, | Performed by: ADVANCED PRACTICE MIDWIFE

## 2019-03-11 PROCEDURE — 59430 PR CARE AFTER DELIVERY ONLY: ICD-10-PCS | Mod: S$PBB,,, | Performed by: ADVANCED PRACTICE MIDWIFE

## 2019-03-11 PROCEDURE — 99212 OFFICE O/P EST SF 10 MIN: CPT | Mod: PBBFAC | Performed by: ADVANCED PRACTICE MIDWIFE

## 2019-03-11 NOTE — PROGRESS NOTES
Jennifer Stark is a 28 y.o.  is here for a postpartum visit.  Estimated Date of Delivery: 2/10/19     DELIVERY HISTORY   delivery on 19 at 38w4d gestation, LFI infant, weight 3147 g, no lacerations. Breastfeeding infant. Brings baby Anusha with her to visit.     DELIVERY NOTE:  Normal spontaneous vaginal delivery of live infant, was placed on mothers abdomen for skin to skin and bulb suctioning performed.  Infant delivered position OA over intact perineum.  Nuchal cord: No.     Spontaneous delivery of placenta and no pitocin given noting good uterine tone.  No lacerations noted.  Patient tolerated delivery well. Sponge needle and lap counted correctly x2.     C/C: Postpartum exam.     Pregnancy was c/b by:  Patient Active Problem List    Diagnosis Date Noted    Postpartum care and examination 2019    Breast feeding status of mother 2019     (spontaneous vaginal delivery) 2019    Delivery outcome of single liveborn infant 2019    Anemia - Fe 2018    Anatomy US -- absent fetal nasal bone  10/16/2018    Right ovarian cyst 10/15/2018    PCOS (polycystic ovarian syndrome) 2018    Uterine fibroids affecting pregnancy, antepartum 2018     HPI:  Doing well; ambulating and tolerating regular diet  Lochia: none/stopped   Vaginal pain: denies  Abdominal pain: denies   Breasts/nipples: breastfeeding well without difficulty and infant is gaining appropriately per their pediatrician  Bowel/bladder function: normal/normal  Depression/anxiety: denies ; EPDS score: 6  Support at home: adequate  Contraception: considering DELATORRE/NFP, declines contraception today    Pap hx: 2018 negative    Past Medical History:   Diagnosis Date    Abnormal cervical Papanicolaou smear 2009    repeat paps    PCOS (polycystic ovarian syndrome)     Metformin late , stopped 2018    Prenatal care in second trimester 10/15/2018     Past Surgical History:   Procedure  "Laterality Date    KNEE SURGERY      WISDOM TOOTH EXTRACTION       Review of patient's allergies indicates:   Allergen Reactions    Pcn [penicillins] Other (See Comments)     Uncertain of reaction - just told from her mother that since she was little she is allergic       Current Outpatient Medications:     ferrous sulfate (FEOSOL) 325 mg (65 mg iron) Tab tablet, Take 1 tablet (325 mg total) by mouth once daily., Disp: 30 tablet, Rfl: 3    ibuprofen (ADVIL,MOTRIN) 600 MG tablet, Take 1 tablet (600 mg total) by mouth every 6 (six) hours as needed (cramping)., Disp: 30 tablet, Rfl: 0    prenatal 48-iron-folic acid-B6 (CITRANATAL B-CALM, FE GLUC,) 20 mg iron-1 mg -25 mg/25 mg TbSQ, Take 1 tablet by mouth once daily., Disp: 30 tablet, Rfl: 9    PE:  OB History    Para Term  AB Living   1 1 1 0 0 1   SAB TAB Ectopic Multiple Live Births   0 0 0 0 1      # Outcome Date GA Lbr Kenneth/2nd Weight Sex Delivery Anes PTL Lv   1 Term 19 38w4d  3.147 kg (6 lb 15 oz) F Vag-Spont None N CARMELA         Vitals:    19 0905   BP: 100/60     /60   Ht 5' 2" (1.575 m)   LMP 2018   BMI 34.88 kg/m²   Gen: A&O x 4, NAD  Neck: non-tender, not enlarged, no masses or nodules  CV: normal HR  Lungs: normal resp effort  Breasts: lactating, bilaterally: no masses, non-tender, nipples intact  Abdomen: soft, nontender, and nondistended, no diastasis recti   Vulva/Vagina: healed well, approximated, non-tender.   Speculum: cervix appears closed, no abn lesions, no abn discharge  Bimanual: uterus involuted, non-tender, neg CMT, adnexa free bilaterally  Pap Smear today? deferred    ASSESSMENT/PLAN:  Postpartum exam s/p  delivery  -- Counseling regarding resuming normal activities of exercise and work.  -- Postpartum precautions reviewed  -- Reviewed pap guidelines. Pap due no later than   -- Continue annual exams; return then or sooner if any symptoms of pp depression or any other problem.    Birth " control counseling  --- Declines, prefers DELATORRE/NFP at this time    Breast Feeding  -- Continue PNV while breastfeeding.      UPDATED MED LIST:  Current Outpatient Medications   Medication Sig Dispense Refill    ferrous sulfate (FEOSOL) 325 mg (65 mg iron) Tab tablet Take 1 tablet (325 mg total) by mouth once daily. 30 tablet 3    ibuprofen (ADVIL,MOTRIN) 600 MG tablet Take 1 tablet (600 mg total) by mouth every 6 (six) hours as needed (cramping). 30 tablet 0    prenatal 48-iron-folic acid-B6 (CITRANATAL B-CALM, FE GLUC,) 20 mg iron-1 mg -25 mg/25 mg TbSQ Take 1 tablet by mouth once daily. 30 tablet 9     No current facility-administered medications for this visit.        Rosa Sears CNM/NP  3/11/278540:05 AM

## 2019-03-15 ENCOUNTER — PATIENT MESSAGE (OUTPATIENT)
Dept: OBSTETRICS AND GYNECOLOGY | Facility: CLINIC | Age: 29
End: 2019-03-15

## 2020-11-12 ENCOUNTER — OFFICE VISIT (OUTPATIENT)
Dept: URGENT CARE | Facility: CLINIC | Age: 30
End: 2020-11-12
Payer: OTHER MISCELLANEOUS

## 2020-11-12 VITALS
HEIGHT: 62 IN | WEIGHT: 180 LBS | BODY MASS INDEX: 33.13 KG/M2 | TEMPERATURE: 98 F | OXYGEN SATURATION: 100 % | HEART RATE: 82 BPM | DIASTOLIC BLOOD PRESSURE: 77 MMHG | SYSTOLIC BLOOD PRESSURE: 122 MMHG | RESPIRATION RATE: 18 BRPM

## 2020-11-12 DIAGNOSIS — S89.90XA KNEE INJURY, UNSPECIFIED LATERALITY, INITIAL ENCOUNTER: Primary | ICD-10-CM

## 2020-11-12 DIAGNOSIS — S80.00XA CONTUSION OF KNEE, UNSPECIFIED LATERALITY, INITIAL ENCOUNTER: ICD-10-CM

## 2020-11-12 PROCEDURE — 99203 OFFICE O/P NEW LOW 30 MIN: CPT | Mod: S$GLB,,, | Performed by: FAMILY MEDICINE

## 2020-11-12 PROCEDURE — 99203 PR OFFICE/OUTPT VISIT, NEW, LEVL III, 30-44 MIN: ICD-10-PCS | Mod: S$GLB,,, | Performed by: FAMILY MEDICINE

## 2020-11-12 PROCEDURE — 73562 XR KNEE 3 VIEW BILATERAL: ICD-10-PCS | Mod: 50,S$GLB,, | Performed by: RADIOLOGY

## 2020-11-12 PROCEDURE — 73562 X-RAY EXAM OF KNEE 3: CPT | Mod: 50,S$GLB,, | Performed by: RADIOLOGY
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
## 2020-11-12 NOTE — LETTER
[facility] [address] [place] [facility] [address]  Phone: [phone]  Fax: [phone]  [facility]: [id number]    Pt Name: Jennifer Stark  Injury Date: 11/12/2020   Employee ID:  Date of First Treatment: 11/12/2020   Company: [employer] to [id number]      Appointment Time: 04:45 PM Arrived: 11/12/20   Provider: Aleksey Olson NP Time Out:6:12 pm     Office Treatment:   1. Knee injury, unspecified laterality, initial encounter    2. Contusion of knee, unspecified laterality, initial encounter          Patient Instructions: Attention not to aggravate affected area    Restrictions: Regular Duty     Return Appointment: as needed

## 2020-11-13 ENCOUNTER — OFFICE VISIT (OUTPATIENT)
Dept: URGENT CARE | Facility: CLINIC | Age: 30
End: 2020-11-13
Payer: OTHER MISCELLANEOUS

## 2020-11-13 VITALS
HEIGHT: 62 IN | BODY MASS INDEX: 33.13 KG/M2 | HEART RATE: 80 BPM | WEIGHT: 180 LBS | OXYGEN SATURATION: 99 % | TEMPERATURE: 98 F | SYSTOLIC BLOOD PRESSURE: 122 MMHG | RESPIRATION RATE: 16 BRPM | DIASTOLIC BLOOD PRESSURE: 52 MMHG

## 2020-11-13 DIAGNOSIS — S80.00XA CONTUSION OF KNEE, UNSPECIFIED LATERALITY, INITIAL ENCOUNTER: ICD-10-CM
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
## 2020-11-13 DIAGNOSIS — S83.411A SPRAIN OF MEDIAL COLLATERAL LIGAMENT OF RIGHT KNEE, INITIAL ENCOUNTER: Primary | ICD-10-CM

## 2020-11-13 PROCEDURE — 99214 PR OFFICE/OUTPT VISIT, EST, LEVL IV, 30-39 MIN: ICD-10-PCS | Mod: S$GLB,,, | Performed by: NURSE PRACTITIONER

## 2020-11-13 PROCEDURE — 99214 OFFICE O/P EST MOD 30 MIN: CPT | Mod: S$GLB,,, | Performed by: NURSE PRACTITIONER

## 2020-11-13 RX ORDER — IBUPROFEN 200 MG
400 TABLET ORAL EVERY 6 HOURS PRN
Refills: 0 | COMMUNITY
Start: 2020-11-13 | End: 2023-04-13 | Stop reason: CLARIF

## 2020-11-13 NOTE — LETTER
[facility] [address] [address]  Phone: [phone]  Fax: [phone]  [facility]: [phone]    Pt Name: Jennifer Stark  Injury Date: 11/12/2020    Date of First Treatment: 11/13/2020   Company: [employer]      Appointment Time: 11:40 AM Arrived: 1145am   Provider: April Slater NP Time Out:1pm     Office Treatment:   1. Sprain of medial collateral ligament of right knee, initial encounter    2. Contusion of knee, unspecified laterality, initial encounter    3. Fall on same level from slipping, tripping or stumbling, initial encounter    4. Work related injury      Medications Ordered This Encounter   Medications    ibuprofen (ADVIL,MOTRIN) 200 MG tablet      Patient Instructions: Attention not to aggravate affected area, Apply ice 24-48 hours then apply heat/warm soaks, Elevated affected area(please take tylenol or ibuprofen as directed on the bottle)    Restrictions: Sit or stand as needed, Avoid frequent bending/lifting/twisting, Avoid climbing/kneeling/squatting, No lifting/pushing/pulling more than 10 lbs     Return Appointment: 11.20.2020 at 1130am

## 2020-11-13 NOTE — PATIENT INSTRUCTIONS
PLEASE READ YOUR DISCHARGE INSTRUCTIONS ENTIRELY AS IT CONTAINS IMPORTANT INFORMATION.    Tylenol and ibuprofen for pain    Rest, ice, compress, and elevate at home    Avoid prolonged use of the affected area until better.     Please see occupational health if you develop new or worsening symptoms or you cannot return to work  Call [facility].     Please arrange follow up with your primary medical clinic as soon as possible. You must understand that you've received an Urgent Care treatment only and that you may be released before all of your medical problems are known or treated. You, the patient, will arrange for follow up as instructed. If your symptoms worsen or fail to improve you should go to the Emergency Room.    Understanding Bone Bruise (Bone Contusion)  A bone bruise is an injury to a bone that is less severe than a bone fracture. Bone bruises are fairly common. They can happen to people of all ages. Any type of bone in your body can be bruised. Other injuries often happen along with a bone bruise, such as damage to nearby ligaments.  What happens when a bone is bruised?  Bone is made of different kinds of tissue. The periosteum is a thin layer of tissue that covers most of a bone. Where bones come together, there is usually a layer of cartilage at the edges. The bone here is called subchondral bone. Deep inside the bone is an area called the medulla. It contains the bone marrow and fibrous tissue called trabeculae.  With a bone fracture, all of the trabeculae in a region of bone have broken. But with a bone bruise, an injury only damages some of these trabeculae. An injury might cause blood to build up in the area beneath the periosteum. This causes a subperiosteal hematoma, a type of bone bruise. An injury might also cause bleeding and swelling in the area between your cartilage and the bone beneath it. This causes a subchondral bone bruise. Or bleeding and swelling can occur in the medulla of your  bone. This is called an intraosseous bone bruise.  What causes a bone bruise?  Injury of any kind can cause a bone bruise. Sports injuries, motor vehicle accidents, or falls from a height can cause them. Twisting injuries that cause joint sprains can also cause a bone bruise. Health conditions like arthritis may also lead to a bone bruise. This is because arthritis causes bone surfaces to grind against each other. Child abuse is another cause of bone bruises.  Symptoms of a bone bruise  Symptoms of a bone bruise can include:  · Pain and soreness in the injured area  · Swelling in the area and soft tissues around it  · Change in color of the injured area  · Swelling or stiffness of an injured joint  This pain is often more severe and lasts longer than a soft tissue injury. How severe your symptoms are and how long they last depends on how severe the bone bruise is.  Diagnosing a bone bruise  Your healthcare provider will ask you about your medical history and symptoms. He or she will ask how you got your injury. Your provider will examine the injured area to check for pain, bruising, and swelling. After the exam, your health care provider may be able to tell if you have a bone bruise.  A bone bruise doesnt show up on an X-ray. But you may be given an X-ray to rule out a bone fracture. A fracture may need a different kind of treatment. An MRI can confirm a bone bruise. But your healthcare provider will likely only give you an MRI if your symptoms dont get better.  Date Last Reviewed: 4/1/2017  © 9765-0077 [employer]. [address] [place] [address], [place] [address]. All rights reserved. This information is not intended as a substitute for professional medical care. Always follow your healthcare professional's instructions.
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
## 2021-01-19 ENCOUNTER — LAB VISIT (OUTPATIENT)
Dept: PRIMARY CARE CLINIC | Facility: OTHER | Age: 31
End: 2021-01-19
Attending: INTERNAL MEDICINE
Payer: MEDICAID

## 2021-01-19 DIAGNOSIS — J02.9 SORE THROAT: ICD-10-CM

## 2021-01-19 DIAGNOSIS — Z20.822 ENCOUNTER FOR LABORATORY TESTING FOR COVID-19 VIRUS: ICD-10-CM

## 2021-01-19 DIAGNOSIS — R51.9 HEAD ACHE: ICD-10-CM

## 2021-01-19 PROCEDURE — U0003 INFECTIOUS AGENT DETECTION BY NUCLEIC ACID (DNA OR RNA); SEVERE ACUTE RESPIRATORY SYNDROME CORONAVIRUS 2 (SARS-COV-2) (CORONAVIRUS DISEASE [COVID-19]), AMPLIFIED PROBE TECHNIQUE, MAKING USE OF HIGH THROUGHPUT TECHNOLOGIES AS DESCRIBED BY CMS-2020-01-R: HCPCS

## 2021-01-21 LAB — SARS-COV-2 RNA RESP QL NAA+PROBE: NOT DETECTED

## 2021-02-03 ENCOUNTER — LAB VISIT (OUTPATIENT)
Dept: LAB | Facility: OTHER | Age: 31
End: 2021-02-03
Payer: MEDICAID

## 2021-02-03 ENCOUNTER — OFFICE VISIT (OUTPATIENT)
Dept: OBSTETRICS AND GYNECOLOGY | Facility: CLINIC | Age: 31
End: 2021-02-03
Payer: MEDICAID

## 2021-02-03 ENCOUNTER — TELEPHONE (OUTPATIENT)
Dept: OBSTETRICS AND GYNECOLOGY | Facility: CLINIC | Age: 31
End: 2021-02-03

## 2021-02-03 VITALS
HEIGHT: 62 IN | WEIGHT: 194.56 LBS | DIASTOLIC BLOOD PRESSURE: 68 MMHG | SYSTOLIC BLOOD PRESSURE: 126 MMHG | BODY MASS INDEX: 35.8 KG/M2

## 2021-02-03 DIAGNOSIS — Z01.419 WELL WOMAN EXAM: ICD-10-CM

## 2021-02-03 DIAGNOSIS — N92.3 INTERMENSTRUAL BLEEDING: Primary | ICD-10-CM

## 2021-02-03 DIAGNOSIS — N92.3 INTERMENSTRUAL BLEEDING: ICD-10-CM

## 2021-02-03 LAB
ALBUMIN SERPL BCP-MCNC: 3.9 G/DL (ref 3.5–5.2)
ALP SERPL-CCNC: 69 U/L (ref 55–135)
ALT SERPL W/O P-5'-P-CCNC: 21 U/L (ref 10–44)
ANION GAP SERPL CALC-SCNC: 8 MMOL/L (ref 8–16)
AST SERPL-CCNC: 23 U/L (ref 10–40)
BASOPHILS # BLD AUTO: 0.04 K/UL (ref 0–0.2)
BASOPHILS NFR BLD: 0.6 % (ref 0–1.9)
BILIRUB SERPL-MCNC: 0.2 MG/DL (ref 0.1–1)
BUN SERPL-MCNC: 9 MG/DL (ref 6–20)
CALCIUM SERPL-MCNC: 9.2 MG/DL (ref 8.7–10.5)
CHLORIDE SERPL-SCNC: 105 MMOL/L (ref 95–110)
CO2 SERPL-SCNC: 26 MMOL/L (ref 23–29)
CREAT SERPL-MCNC: 0.8 MG/DL (ref 0.5–1.4)
DIFFERENTIAL METHOD: ABNORMAL
EOSINOPHIL # BLD AUTO: 0.1 K/UL (ref 0–0.5)
EOSINOPHIL NFR BLD: 0.7 % (ref 0–8)
ERYTHROCYTE [DISTWIDTH] IN BLOOD BY AUTOMATED COUNT: 13.2 % (ref 11.5–14.5)
EST. GFR  (AFRICAN AMERICAN): >60 ML/MIN/1.73 M^2
EST. GFR  (NON AFRICAN AMERICAN): >60 ML/MIN/1.73 M^2
GLUCOSE SERPL-MCNC: 107 MG/DL (ref 70–110)
HCT VFR BLD AUTO: 37.9 % (ref 37–48.5)
HGB BLD-MCNC: 11.7 G/DL (ref 12–16)
IMM GRANULOCYTES # BLD AUTO: 0.01 K/UL (ref 0–0.04)
IMM GRANULOCYTES NFR BLD AUTO: 0.1 % (ref 0–0.5)
LYMPHOCYTES # BLD AUTO: 2.6 K/UL (ref 1–4.8)
LYMPHOCYTES NFR BLD: 35.9 % (ref 18–48)
MCH RBC QN AUTO: 25.6 PG (ref 27–31)
MCHC RBC AUTO-ENTMCNC: 30.9 G/DL (ref 32–36)
MCV RBC AUTO: 83 FL (ref 82–98)
MONOCYTES # BLD AUTO: 0.8 K/UL (ref 0.3–1)
MONOCYTES NFR BLD: 10.5 % (ref 4–15)
NEUTROPHILS # BLD AUTO: 3.7 K/UL (ref 1.8–7.7)
NEUTROPHILS NFR BLD: 52.2 % (ref 38–73)
NRBC BLD-RTO: 0 /100 WBC
PLATELET # BLD AUTO: 328 K/UL (ref 150–350)
PMV BLD AUTO: 10.1 FL (ref 9.2–12.9)
POTASSIUM SERPL-SCNC: 4.2 MMOL/L (ref 3.5–5.1)
PROT SERPL-MCNC: 7.8 G/DL (ref 6–8.4)
RBC # BLD AUTO: 4.57 M/UL (ref 4–5.4)
SODIUM SERPL-SCNC: 139 MMOL/L (ref 136–145)
T4 FREE SERPL-MCNC: 1.02 NG/DL (ref 0.71–1.51)
TSH SERPL DL<=0.005 MIU/L-ACNC: 1.12 UIU/ML (ref 0.4–4)
WBC # BLD AUTO: 7.13 K/UL (ref 3.9–12.7)

## 2021-02-03 PROCEDURE — 99213 OFFICE O/P EST LOW 20 MIN: CPT | Mod: PBBFAC | Performed by: ADVANCED PRACTICE MIDWIFE

## 2021-02-03 PROCEDURE — 99999 PR PBB SHADOW E&M-EST. PATIENT-LVL III: ICD-10-PCS | Mod: PBBFAC,,, | Performed by: ADVANCED PRACTICE MIDWIFE

## 2021-02-03 PROCEDURE — 80053 COMPREHEN METABOLIC PANEL: CPT

## 2021-02-03 PROCEDURE — 83036 HEMOGLOBIN GLYCOSYLATED A1C: CPT

## 2021-02-03 PROCEDURE — 99999 PR PBB SHADOW E&M-EST. PATIENT-LVL III: CPT | Mod: PBBFAC,,, | Performed by: ADVANCED PRACTICE MIDWIFE

## 2021-02-03 PROCEDURE — 99395 PR PREVENTIVE VISIT,EST,18-39: ICD-10-PCS | Mod: S$PBB,,, | Performed by: ADVANCED PRACTICE MIDWIFE

## 2021-02-03 PROCEDURE — 80061 LIPID PANEL: CPT

## 2021-02-03 PROCEDURE — 85025 COMPLETE CBC W/AUTO DIFF WBC: CPT

## 2021-02-03 PROCEDURE — 84443 ASSAY THYROID STIM HORMONE: CPT

## 2021-02-03 PROCEDURE — 84439 ASSAY OF FREE THYROXINE: CPT

## 2021-02-03 PROCEDURE — 36415 COLL VENOUS BLD VENIPUNCTURE: CPT

## 2021-02-03 PROCEDURE — 99395 PREV VISIT EST AGE 18-39: CPT | Mod: S$PBB,,, | Performed by: ADVANCED PRACTICE MIDWIFE

## 2021-02-04 LAB
CHOLEST SERPL-MCNC: 178 MG/DL (ref 120–199)
CHOLEST/HDLC SERPL: 4.7 {RATIO} (ref 2–5)
HDLC SERPL-MCNC: 38 MG/DL (ref 40–75)
HDLC SERPL: 21.3 % (ref 20–50)
LDLC SERPL CALC-MCNC: 113.8 MG/DL (ref 63–159)
NONHDLC SERPL-MCNC: 140 MG/DL
TRIGL SERPL-MCNC: 131 MG/DL (ref 30–150)

## 2021-02-05 LAB
ESTIMATED AVG GLUCOSE: 120 MG/DL (ref 68–131)
HBA1C MFR BLD: 5.8 % (ref 4–5.6)

## 2021-02-15 ENCOUNTER — TELEPHONE (OUTPATIENT)
Dept: ADMINISTRATIVE | Facility: OTHER | Age: 31
End: 2021-02-15

## 2021-02-18 ENCOUNTER — HOSPITAL ENCOUNTER (OUTPATIENT)
Dept: RADIOLOGY | Facility: OTHER | Age: 31
Discharge: HOME | End: 2021-02-18
Attending: ADVANCED PRACTICE MIDWIFE
Payer: MEDICAID

## 2021-02-18 DIAGNOSIS — Z01.419 WELL WOMAN EXAM: ICD-10-CM

## 2021-02-18 DIAGNOSIS — N92.3 INTERMENSTRUAL BLEEDING: ICD-10-CM

## 2021-02-18 PROCEDURE — 76830 US PELVIS COMP WITH TRANSVAG NON-OB (XPD): ICD-10-PCS | Mod: 26,,, | Performed by: RADIOLOGY

## 2021-02-18 PROCEDURE — 76830 TRANSVAGINAL US NON-OB: CPT | Mod: 26,,, | Performed by: RADIOLOGY

## 2021-02-18 PROCEDURE — 76856 US PELVIS COMP WITH TRANSVAG NON-OB (XPD): ICD-10-PCS | Mod: 26,,, | Performed by: RADIOLOGY

## 2021-02-18 PROCEDURE — 76856 US EXAM PELVIC COMPLETE: CPT | Mod: 26,,, | Performed by: RADIOLOGY

## 2021-02-18 PROCEDURE — 76856 US EXAM PELVIC COMPLETE: CPT | Mod: TC

## 2021-04-26 ENCOUNTER — PATIENT MESSAGE (OUTPATIENT)
Dept: RESEARCH | Facility: HOSPITAL | Age: 31
End: 2021-04-26

## 2021-08-21 ENCOUNTER — LAB VISIT (OUTPATIENT)
Dept: PRIMARY CARE CLINIC | Facility: OTHER | Age: 31
End: 2021-08-21
Payer: MEDICAID

## 2021-08-21 DIAGNOSIS — Z20.822 ENCOUNTER FOR LABORATORY TESTING FOR COVID-19 VIRUS: ICD-10-CM

## 2021-08-21 PROCEDURE — U0003 INFECTIOUS AGENT DETECTION BY NUCLEIC ACID (DNA OR RNA); SEVERE ACUTE RESPIRATORY SYNDROME CORONAVIRUS 2 (SARS-COV-2) (CORONAVIRUS DISEASE [COVID-19]), AMPLIFIED PROBE TECHNIQUE, MAKING USE OF HIGH THROUGHPUT TECHNOLOGIES AS DESCRIBED BY CMS-2020-01-R: HCPCS | Performed by: INTERNAL MEDICINE

## 2021-08-24 LAB
SARS-COV-2 RNA RESP QL NAA+PROBE: NOT DETECTED
SARS-COV-2- CYCLE NUMBER: NORMAL

## 2021-10-02 ENCOUNTER — HOSPITAL ENCOUNTER (EMERGENCY)
Facility: OTHER | Age: 31
Discharge: HOME | End: 2021-10-03
Attending: EMERGENCY MEDICINE
Payer: MEDICAID

## 2021-10-02 DIAGNOSIS — G43.909 MIGRAINE WITHOUT STATUS MIGRAINOSUS, NOT INTRACTABLE, UNSPECIFIED MIGRAINE TYPE: Primary | ICD-10-CM

## 2021-10-02 LAB
B-HCG UR QL: NEGATIVE
CTP QC/QA: YES

## 2021-10-02 PROCEDURE — 81025 URINE PREGNANCY TEST: CPT | Performed by: EMERGENCY MEDICINE

## 2021-10-02 PROCEDURE — 99284 EMERGENCY DEPT VISIT MOD MDM: CPT | Mod: 25

## 2021-10-02 PROCEDURE — 63600175 PHARM REV CODE 636 W HCPCS: Performed by: EMERGENCY MEDICINE

## 2021-10-02 RX ORDER — SUMATRIPTAN 6 MG/.5ML
6 INJECTION, SOLUTION SUBCUTANEOUS
Status: COMPLETED | OUTPATIENT
Start: 2021-10-02 | End: 2021-10-02

## 2021-10-02 RX ADMIN — SUMATRIPTAN 6 MG: 6 INJECTION, SOLUTION SUBCUTANEOUS at 11:10

## 2021-10-03 VITALS
HEART RATE: 82 BPM | OXYGEN SATURATION: 96 % | RESPIRATION RATE: 18 BRPM | HEIGHT: 62 IN | TEMPERATURE: 98 F | SYSTOLIC BLOOD PRESSURE: 132 MMHG | DIASTOLIC BLOOD PRESSURE: 84 MMHG | WEIGHT: 195 LBS | BODY MASS INDEX: 35.88 KG/M2

## 2021-10-03 PROCEDURE — 96372 THER/PROPH/DIAG INJ SC/IM: CPT | Mod: 59

## 2021-10-03 PROCEDURE — 63600175 PHARM REV CODE 636 W HCPCS: Performed by: EMERGENCY MEDICINE

## 2021-10-03 PROCEDURE — 25000003 PHARM REV CODE 250: Performed by: EMERGENCY MEDICINE

## 2021-10-03 RX ORDER — SUMATRIPTAN 50 MG/1
50 TABLET, FILM COATED ORAL
Qty: 30 TABLET | Refills: 0 | Status: SHIPPED | OUTPATIENT
Start: 2021-10-03

## 2021-10-03 RX ORDER — ONDANSETRON 4 MG/1
4 TABLET, ORALLY DISINTEGRATING ORAL
Status: COMPLETED | OUTPATIENT
Start: 2021-10-03 | End: 2021-10-03

## 2021-10-03 RX ORDER — KETOROLAC TROMETHAMINE 30 MG/ML
15 INJECTION, SOLUTION INTRAMUSCULAR; INTRAVENOUS
Status: COMPLETED | OUTPATIENT
Start: 2021-10-03 | End: 2021-10-03

## 2021-10-03 RX ORDER — ONDANSETRON 4 MG/1
4 TABLET, ORALLY DISINTEGRATING ORAL EVERY 8 HOURS PRN
Qty: 15 TABLET | Refills: 0 | Status: SHIPPED | OUTPATIENT
Start: 2021-10-03

## 2021-10-03 RX ADMIN — ONDANSETRON 4 MG: 4 TABLET, ORALLY DISINTEGRATING ORAL at 12:10

## 2021-10-03 RX ADMIN — KETOROLAC TROMETHAMINE 15 MG: 30 INJECTION, SOLUTION INTRAMUSCULAR at 12:10

## 2021-10-04 ENCOUNTER — PATIENT MESSAGE (OUTPATIENT)
Dept: OBSTETRICS AND GYNECOLOGY | Facility: CLINIC | Age: 31
End: 2021-10-04

## 2021-10-08 ENCOUNTER — TELEPHONE (OUTPATIENT)
Dept: OBSTETRICS AND GYNECOLOGY | Facility: CLINIC | Age: 31
End: 2021-10-08

## 2021-10-10 ENCOUNTER — TELEPHONE (OUTPATIENT)
Dept: OBSTETRICS AND GYNECOLOGY | Facility: HOSPITAL | Age: 31
End: 2021-10-10

## 2021-10-10 DIAGNOSIS — Z30.9 ENCOUNTER FOR CONTRACEPTIVE MANAGEMENT, UNSPECIFIED TYPE: Primary | ICD-10-CM

## 2021-10-10 RX ORDER — DROSPIRENONE 4 MG/1
4 TABLET, FILM COATED ORAL DAILY
Qty: 30 TABLET | Refills: 4 | Status: SHIPPED | OUTPATIENT
Start: 2021-10-10 | End: 2022-03-28

## 2021-11-02 DIAGNOSIS — G43.019 INTRACTABLE MIGRAINE WITHOUT AURA AND WITHOUT STATUS MIGRAINOSUS: Primary | ICD-10-CM

## 2022-01-02 ENCOUNTER — PATIENT MESSAGE (OUTPATIENT)
Dept: ADMINISTRATIVE | Facility: OTHER | Age: 32
End: 2022-01-02
Payer: MEDICAID

## 2022-01-02 ENCOUNTER — LAB VISIT (OUTPATIENT)
Dept: PRIMARY CARE CLINIC | Facility: OTHER | Age: 32
End: 2022-01-02
Attending: INTERNAL MEDICINE
Payer: MEDICAID

## 2022-01-02 DIAGNOSIS — Z20.822 ENCOUNTER FOR LABORATORY TESTING FOR COVID-19 VIRUS: ICD-10-CM

## 2022-01-02 PROCEDURE — U0003 INFECTIOUS AGENT DETECTION BY NUCLEIC ACID (DNA OR RNA); SEVERE ACUTE RESPIRATORY SYNDROME CORONAVIRUS 2 (SARS-COV-2) (CORONAVIRUS DISEASE [COVID-19]), AMPLIFIED PROBE TECHNIQUE, MAKING USE OF HIGH THROUGHPUT TECHNOLOGIES AS DESCRIBED BY CMS-2020-01-R: HCPCS | Mod: ST72 | Performed by: INTERNAL MEDICINE

## 2022-01-06 ENCOUNTER — PATIENT MESSAGE (OUTPATIENT)
Dept: ADMINISTRATIVE | Facility: OTHER | Age: 32
End: 2022-01-06
Payer: MEDICAID

## 2022-01-07 LAB
SARS-COV-2 RNA RESP QL NAA+PROBE: NOT DETECTED
SARS-COV-2- CYCLE NUMBER: NORMAL

## 2022-03-28 ENCOUNTER — PATIENT MESSAGE (OUTPATIENT)
Dept: OBSTETRICS AND GYNECOLOGY | Facility: CLINIC | Age: 32
End: 2022-03-28
Payer: MEDICAID

## 2023-03-07 ENCOUNTER — TELEPHONE (OUTPATIENT)
Dept: OBSTETRICS AND GYNECOLOGY | Facility: CLINIC | Age: 33
End: 2023-03-07
Payer: COMMERCIAL

## 2023-03-07 ENCOUNTER — PATIENT MESSAGE (OUTPATIENT)
Dept: OBSTETRICS AND GYNECOLOGY | Facility: CLINIC | Age: 33
End: 2023-03-07
Payer: COMMERCIAL

## 2023-03-07 DIAGNOSIS — Z30.9 ENCOUNTER FOR CONTRACEPTIVE MANAGEMENT, UNSPECIFIED TYPE: Primary | ICD-10-CM

## 2023-03-29 ENCOUNTER — HOSPITAL ENCOUNTER (EMERGENCY)
Facility: OTHER | Age: 33
Discharge: HOME | End: 2023-03-29
Attending: EMERGENCY MEDICINE
Payer: COMMERCIAL

## 2023-03-29 VITALS
OXYGEN SATURATION: 100 % | DIASTOLIC BLOOD PRESSURE: 72 MMHG | RESPIRATION RATE: 18 BRPM | TEMPERATURE: 99 F | HEIGHT: 62 IN | SYSTOLIC BLOOD PRESSURE: 128 MMHG | WEIGHT: 180 LBS | BODY MASS INDEX: 33.13 KG/M2 | HEART RATE: 88 BPM

## 2023-03-29 DIAGNOSIS — K80.20 CALCULUS OF GALLBLADDER WITHOUT CHOLECYSTITIS WITHOUT OBSTRUCTION: Primary | ICD-10-CM

## 2023-03-29 DIAGNOSIS — R10.13 EPIGASTRIC PAIN: ICD-10-CM

## 2023-03-29 LAB
ALBUMIN SERPL BCP-MCNC: 3.7 G/DL (ref 3.5–5.2)
ALP SERPL-CCNC: 72 U/L (ref 55–135)
ALT SERPL W/O P-5'-P-CCNC: 21 U/L (ref 10–44)
ANION GAP SERPL CALC-SCNC: 10 MMOL/L (ref 8–16)
AST SERPL-CCNC: 23 U/L (ref 10–40)
BASOPHILS # BLD AUTO: 0.04 K/UL (ref 0–0.2)
BASOPHILS NFR BLD: 0.8 % (ref 0–1.9)
BILIRUB SERPL-MCNC: 0.1 MG/DL (ref 0.1–1)
BUN SERPL-MCNC: 7 MG/DL (ref 6–20)
CALCIUM SERPL-MCNC: 9.2 MG/DL (ref 8.7–10.5)
CHLORIDE SERPL-SCNC: 107 MMOL/L (ref 95–110)
CO2 SERPL-SCNC: 19 MMOL/L (ref 23–29)
CREAT SERPL-MCNC: 0.8 MG/DL (ref 0.5–1.4)
DIFFERENTIAL METHOD: ABNORMAL
EOSINOPHIL # BLD AUTO: 0.1 K/UL (ref 0–0.5)
EOSINOPHIL NFR BLD: 2 % (ref 0–8)
ERYTHROCYTE [DISTWIDTH] IN BLOOD BY AUTOMATED COUNT: 13 % (ref 11.5–14.5)
EST. GFR  (NO RACE VARIABLE): >60 ML/MIN/1.73 M^2
GLUCOSE SERPL-MCNC: 115 MG/DL (ref 70–110)
HCT VFR BLD AUTO: 39.9 % (ref 37–48.5)
HGB BLD-MCNC: 12.6 G/DL (ref 12–16)
IMM GRANULOCYTES # BLD AUTO: 0.01 K/UL (ref 0–0.04)
IMM GRANULOCYTES NFR BLD AUTO: 0.2 % (ref 0–0.5)
LIPASE SERPL-CCNC: 33 U/L (ref 4–60)
LYMPHOCYTES # BLD AUTO: 1.6 K/UL (ref 1–4.8)
LYMPHOCYTES NFR BLD: 32.4 % (ref 18–48)
MCH RBC QN AUTO: 26.3 PG (ref 27–31)
MCHC RBC AUTO-ENTMCNC: 31.6 G/DL (ref 32–36)
MCV RBC AUTO: 83 FL (ref 82–98)
MONOCYTES # BLD AUTO: 0.4 K/UL (ref 0.3–1)
MONOCYTES NFR BLD: 9 % (ref 4–15)
NEUTROPHILS # BLD AUTO: 2.7 K/UL (ref 1.8–7.7)
NEUTROPHILS NFR BLD: 55.6 % (ref 38–73)
NRBC BLD-RTO: 0 /100 WBC
PLATELET # BLD AUTO: 308 K/UL (ref 150–450)
PMV BLD AUTO: 10.8 FL (ref 9.2–12.9)
POTASSIUM SERPL-SCNC: 4.8 MMOL/L (ref 3.5–5.1)
PROT SERPL-MCNC: 8.1 G/DL (ref 6–8.4)
RBC # BLD AUTO: 4.79 M/UL (ref 4–5.4)
SODIUM SERPL-SCNC: 136 MMOL/L (ref 136–145)
WBC # BLD AUTO: 4.91 K/UL (ref 3.9–12.7)

## 2023-03-29 PROCEDURE — 83690 ASSAY OF LIPASE: CPT | Performed by: EMERGENCY MEDICINE

## 2023-03-29 PROCEDURE — 99284 EMERGENCY DEPT VISIT MOD MDM: CPT | Mod: 25

## 2023-03-29 PROCEDURE — 80053 COMPREHEN METABOLIC PANEL: CPT | Performed by: EMERGENCY MEDICINE

## 2023-03-29 PROCEDURE — 25000003 PHARM REV CODE 250: Performed by: EMERGENCY MEDICINE

## 2023-03-29 PROCEDURE — 85025 COMPLETE CBC W/AUTO DIFF WBC: CPT | Performed by: EMERGENCY MEDICINE

## 2023-03-29 RX ORDER — SUCRALFATE 1 G/1
1 TABLET ORAL
Qty: 40 TABLET | Refills: 0 | Status: SHIPPED | OUTPATIENT
Start: 2023-03-29 | End: 2023-03-29 | Stop reason: CLARIF

## 2023-03-29 RX ORDER — ONDANSETRON 4 MG/1
4 TABLET, ORALLY DISINTEGRATING ORAL EVERY 6 HOURS PRN
Qty: 10 TABLET | Refills: 0 | Status: SHIPPED | OUTPATIENT
Start: 2023-03-29 | End: 2023-04-05

## 2023-03-29 RX ORDER — LIDOCAINE HYDROCHLORIDE 20 MG/ML
15 SOLUTION OROPHARYNGEAL ONCE
Status: COMPLETED | OUTPATIENT
Start: 2023-03-29 | End: 2023-03-29

## 2023-03-29 RX ORDER — OMEPRAZOLE 40 MG/1
40 CAPSULE, DELAYED RELEASE ORAL DAILY
Qty: 30 CAPSULE | Refills: 0 | Status: SHIPPED | OUTPATIENT
Start: 2023-03-29 | End: 2023-12-28

## 2023-03-29 RX ORDER — ONDANSETRON 4 MG/1
4 TABLET, ORALLY DISINTEGRATING ORAL
Status: COMPLETED | OUTPATIENT
Start: 2023-03-29 | End: 2023-03-29

## 2023-03-29 RX ORDER — DICYCLOMINE HYDROCHLORIDE 20 MG/1
20 TABLET ORAL 2 TIMES DAILY
Qty: 20 TABLET | Refills: 0 | Status: SHIPPED | OUTPATIENT
Start: 2023-03-29 | End: 2023-04-28

## 2023-03-29 RX ORDER — CIMETIDINE 300 MG/1
300 TABLET, FILM COATED ORAL 2 TIMES DAILY
Qty: 30 TABLET | Refills: 0 | Status: SHIPPED | OUTPATIENT
Start: 2023-03-29 | End: 2023-03-29 | Stop reason: CLARIF

## 2023-03-29 RX ORDER — IBUPROFEN 600 MG/1
600 TABLET ORAL EVERY 6 HOURS PRN
Qty: 20 TABLET | Refills: 0 | Status: SHIPPED | OUTPATIENT
Start: 2023-03-29 | End: 2023-04-13 | Stop reason: CLARIF

## 2023-03-29 RX ORDER — MAG HYDROX/ALUMINUM HYD/SIMETH 200-200-20
30 SUSPENSION, ORAL (FINAL DOSE FORM) ORAL ONCE
Status: COMPLETED | OUTPATIENT
Start: 2023-03-29 | End: 2023-03-29

## 2023-03-29 RX ADMIN — ONDANSETRON 4 MG: 4 TABLET, ORALLY DISINTEGRATING ORAL at 04:03

## 2023-03-29 RX ADMIN — LIDOCAINE HYDROCHLORIDE 15 ML: 20 SOLUTION ORAL; TOPICAL at 04:03

## 2023-03-29 RX ADMIN — ALUMINUM HYDROXIDE, MAGNESIUM HYDROXIDE, AND SIMETHICONE 30 ML: 200; 200; 20 SUSPENSION ORAL at 04:03

## 2023-03-29 NOTE — ED PROVIDER NOTES
"     Source of History:  pATIENT    Chief complaint:  Abdominal Pain (Patient to ED with c/o abdominal pain with n/v/d that started at 2200 )      HPI:  Jennifer Stark is a 32 y.o. female with no pertinent PMHx presenting to the ED for evaluation of "burning" epigastric abdominal pain ongoing for several months. Pt reports she initially attributed the first episode several months ago to spicy food as it was immediately onset after eating  food. She reports intensity and frequency in painful episodes have increased to several times weekly. No recent inciting factors, stating the episode today began without P/O intake prior. Pt notes associated vomiting and diarrhea during these episodes, most recent emesis episode 1 hour PTA. She has taken GasX and Tums for her symptoms with no relief. She has not yet been evaluated for her symptoms. She has no history of GERD but does state "I have GI tract problems." Denies fever, chills, pain elsewhere, other somatic complaints.    This is the extent to the patients complaints today here in the emergency department.    ROS:   See HPI.    Review of patient's allergies indicates:   Allergen Reactions    Pcn [penicillins] Other (See Comments)     Uncertain of reaction - just told from her mother that since she was little she is allergic       PMH:  As per HPI and below:  Past Medical History:   Diagnosis Date    Abnormal cervical Papanicolaou smear 2009    repeat paps    PCOS (polycystic ovarian syndrome)     Metformin late 2015, stopped 9/2018    Prenatal care in second trimester 10/15/2018     Past Surgical History:   Procedure Laterality Date    KNEE SURGERY  2003    WISDOM TOOTH EXTRACTION         Social History     Tobacco Use    Smoking status: Never    Smokeless tobacco: Never   Substance Use Topics    Alcohol use: Yes    Drug use: No     Comment: None with pregnancy       Physical Exam:    /78 (BP Location: Right arm, Patient Position: Sitting)   Pulse 90   " "Temp 98.9 °F (37.2 °C) (Oral)   Resp 16   Ht 5' 2" (1.575 m)   Wt 81.6 kg (180 lb)   SpO2 100%   BMI 32.92 kg/m²   Nursing note and vital signs reviewed.  Constitutional: No acute distress.  Nontoxic  Cardiovascular: Regular rate and rhythm.  No murmurs. No gallops. No rubs  Respiratory: Clear to auscultation bilaterally.  Good air movement.  No wheezes.  No rhonchi. No rales. No accessory muscle use.  Abdomen:  Epigastric abdominal tenderness to palpation. Negative Ceballos's. Negative McBurney's.   Neuro: Alert. No focal deficits.  Skin: No rashes seen.     I decided to obtain the patient's medical records.  Summary of Medical Records:      MDM/ Differential Dx:   32-year-old female with epigastric pain with vomiting.  Sits been going on for months early on intermittently but now more consistently.  I suspect most likely acid reflux.  I do not suspect pancreatitis.  Lower suspicion for cholelithiasis/biliary colic.  Will go ahead and give a GI cocktail as well as some Zofran.  If this improves the symptoms I do not feel any further workup is indicated and feel comfortable discharging home with antacids.  If no improvement I feel further workup including blood work and an ultrasound of the gallbladder would be indicated.  She is not toxic.  There is no evidence of diverticulitis or acute appendicitis based on her examination or clinical history.    Social Concerns:      ED Course as of 03/29/23 0557   Wed Mar 29, 2023   0414 On re-evaluation patient states she did have some improvement after the GI cocktail but still is having some discomfort.  Will go ahead and get blood work including lipase as well as an ultrasound and continue to observe. [SM]   0437 WBC: 4.91 [SM]   0437 Hemoglobin: 12.6 [SM]   0437 Platelets: 308 [SM]   0457 Lipase: 33 [SM]   0457 Sodium: 136 [SM]   0457 Potassium: 4.8 [SM]   0457 BILIRUBIN TOTAL: 0.1 [SM]   0457 Alkaline Phosphatase: 72 [SM]   0457 AST: 23 [SM]   0457 ALT: 21 [SM]   0557 " Dr. Ramsay's will discuss with General surgery regarding findings on the ultrasound and for ultimate disposition. [SM]      ED Course User Index  [SM] Juan Manuel Arndt DO               Diagnostic Impression:    1. Calculus of gallbladder without cholecystitis without obstruction    2. Epigastric pain        I, Yamila Salamanca, scribed for, and in the presence of, Juan Manuel Arndt DO. I performed the scribed service and the documentation accurately describes the services I performed. I attest to the accuracy of the note.     I, Dr Juan Manuel Arndt, personally performed the services described in this documentation. All medical record entries made by the scribe were at my direction and in my presence. I have reviewed the chart and agree that the record reflects my personal performance and is accurate and complete.          Juan Manuel Arndt DO  03/29/23 0557

## 2023-03-29 NOTE — PROVIDER PROGRESS NOTES - EMERGENCY DEPT.
Encounter Date: 3/29/2023    ED Physician Progress Notes        Care of patient taken over by me at 6:00 a.m..  Patient is a 32-year-old female with complaint of midepigastric abdominal pain. Patient is afebrile, nontoxic appearing with stable vital signs. Patient has mild midepigastric tenderness to palpation.  Patient has no right upper quadrant tenderness to palpation.  Patient has no elevation of white blood cell count.  H&H within normal limits.  No acute findings on CMP.  Lipase is 33.  Abdominal ultrasound reveals a nonmobile gallstone at the neck of the gallbladder with moderate gallbladder distention and without evidence for acute cholecystitis.  Patient states that the GI cocktail resolved her midepigastric abdominal pain.  In the differential diagnosis and certainly a possibility is that patient has a gastric/peptic ulcer.  I will start the patient on Prilosec and have patient follow-up with gastroenterology for further evaluation in the next 24-48 hours.  I consulted and discussed patient with general surgeon, Dr. Funk at 6:30 a.m..  No acute intervention at this time.  The patient is directed to follow up with general surgeon, Dr. Bettencourt within the next 24-48 hours.  The patient is counseled on return precautions.  The patient is extensively counseled on their diagnosis and treatment including all diagnostic, laboratory and physical exam findings.  The patient is discharged good condition.   fall

## 2023-04-06 ENCOUNTER — OFFICE VISIT (OUTPATIENT)
Dept: SURGERY | Facility: CLINIC | Age: 33
End: 2023-04-06
Attending: SPECIALIST
Payer: MEDICAID

## 2023-04-06 VITALS — SYSTOLIC BLOOD PRESSURE: 117 MMHG | DIASTOLIC BLOOD PRESSURE: 70 MMHG | OXYGEN SATURATION: 100 % | HEART RATE: 94 BPM

## 2023-04-06 DIAGNOSIS — K82.9 GALLBLADDER ATTACK: Primary | ICD-10-CM

## 2023-04-06 PROCEDURE — 1160F RVW MEDS BY RX/DR IN RCRD: CPT | Mod: CPTII,S$GLB,, | Performed by: SPECIALIST

## 2023-04-06 PROCEDURE — 99213 OFFICE O/P EST LOW 20 MIN: CPT | Mod: PBBFAC | Performed by: SPECIALIST

## 2023-04-06 PROCEDURE — 99204 PR OFFICE/OUTPT VISIT, NEW, LEVL IV, 45-59 MIN: ICD-10-PCS | Mod: S$GLB,,, | Performed by: SPECIALIST

## 2023-04-06 PROCEDURE — 3078F PR MOST RECENT DIASTOLIC BLOOD PRESSURE < 80 MM HG: ICD-10-PCS | Mod: CPTII,S$GLB,, | Performed by: SPECIALIST

## 2023-04-06 PROCEDURE — 99999 PR PBB SHADOW E&M-EST. PATIENT-LVL III: ICD-10-PCS | Mod: PBBFAC,,, | Performed by: SPECIALIST

## 2023-04-06 PROCEDURE — 99204 OFFICE O/P NEW MOD 45 MIN: CPT | Mod: S$GLB,,, | Performed by: SPECIALIST

## 2023-04-06 PROCEDURE — 3078F DIAST BP <80 MM HG: CPT | Mod: CPTII,S$GLB,, | Performed by: SPECIALIST

## 2023-04-06 PROCEDURE — 3074F PR MOST RECENT SYSTOLIC BLOOD PRESSURE < 130 MM HG: ICD-10-PCS | Mod: CPTII,S$GLB,, | Performed by: SPECIALIST

## 2023-04-06 PROCEDURE — 1160F PR REVIEW ALL MEDS BY PRESCRIBER/CLIN PHARMACIST DOCUMENTED: ICD-10-PCS | Mod: CPTII,S$GLB,, | Performed by: SPECIALIST

## 2023-04-06 PROCEDURE — 99999 PR PBB SHADOW E&M-EST. PATIENT-LVL III: CPT | Mod: PBBFAC,,, | Performed by: SPECIALIST

## 2023-04-06 PROCEDURE — 1159F MED LIST DOCD IN RCRD: CPT | Mod: CPTII,S$GLB,, | Performed by: SPECIALIST

## 2023-04-06 PROCEDURE — 1159F PR MEDICATION LIST DOCUMENTED IN MEDICAL RECORD: ICD-10-PCS | Mod: CPTII,S$GLB,, | Performed by: SPECIALIST

## 2023-04-06 PROCEDURE — 3074F SYST BP LT 130 MM HG: CPT | Mod: CPTII,S$GLB,, | Performed by: SPECIALIST

## 2023-04-06 NOTE — PROGRESS NOTES
History & Physical    SUBJECTIVE:     History of Present Illness:  Patient is a 32 y.o. female presents with history of postprandial epigastric and right upper quadrant pain.  Patient seen in emergency room and imaging study showed gallstones.  No fever, chills, jaundice    Chief Complaint   Patient presents with    Gall Bladder Problem       Review of patient's allergies indicates:   Allergen Reactions    Pcn [penicillins] Other (See Comments)     Uncertain of reaction - just told from her mother that since she was little she is allergic       Current Outpatient Medications   Medication Sig Dispense Refill    dicyclomine (BENTYL) 20 mg tablet Take 1 tablet (20 mg total) by mouth 2 (two) times daily. 20 tablet 0    drospirenone, contraceptive, (SLYND) 4 mg (28) Tab Take 1 tablet (4 mg total) by mouth once daily. 28 tablet 0    ibuprofen (ADVIL,MOTRIN) 200 MG tablet Take 2 tablets (400 mg total) by mouth every 6 (six) hours as needed for Pain.  0    ibuprofen (ADVIL,MOTRIN) 600 MG tablet Take 1 tablet (600 mg total) by mouth every 6 (six) hours as needed for Pain. 20 tablet 0    omeprazole (PRILOSEC) 40 MG capsule Take 1 capsule (40 mg total) by mouth once daily. 30 capsule 0    ondansetron (ZOFRAN-ODT) 4 MG TbDL Take 1 tablet (4 mg total) by mouth every 8 (eight) hours as needed (Nausea). 15 tablet 0    sumatriptan (IMITREX) 50 MG tablet Take 1 tablet (50 mg total) by mouth every 2 (two) hours as needed for Migraine (Take 2 doses maximum per day). 30 tablet 0     No current facility-administered medications for this visit.       Past Medical History:   Diagnosis Date    Abnormal cervical Papanicolaou smear 2009    repeat paps    PCOS (polycystic ovarian syndrome)     Metformin late 2015, stopped 9/2018    Prenatal care in second trimester 10/15/2018     Past Surgical History:   Procedure Laterality Date    KNEE SURGERY  2003    WISDOM TOOTH EXTRACTION       Family History   Problem Relation Age of Onset    No Known  Problems Father     Thyroid disease Mother     No Known Problems Sister     No Known Problems Brother     No Known Problems Maternal Aunt     No Known Problems Maternal Uncle     No Known Problems Paternal Aunt     No Known Problems Paternal Uncle     Cataracts Maternal Grandmother     Hypertension Maternal Grandmother     Stroke Maternal Grandfather     No Known Problems Paternal Grandmother     Lung cancer Paternal Grandfather         Environmental    Breast cancer Neg Hx     Colon cancer Neg Hx     Ovarian cancer Neg Hx     Amblyopia Neg Hx     Blindness Neg Hx     Cancer Neg Hx     Diabetes Neg Hx     Glaucoma Neg Hx     Macular degeneration Neg Hx     Retinal detachment Neg Hx     Strabismus Neg Hx      Social History     Tobacco Use    Smoking status: Never    Smokeless tobacco: Never   Substance Use Topics    Alcohol use: Yes    Drug use: No     Comment: None with pregnancy        Review of Systems:  Review of Systems   Constitutional: Negative.  Negative for fatigue and fever.   HENT: Negative.  Negative for sore throat and trouble swallowing.    Eyes: Negative.    Respiratory: Negative.     Cardiovascular: Negative.  Negative for chest pain.   Gastrointestinal:  Positive for abdominal distention and abdominal pain.   Genitourinary: Negative.    Musculoskeletal: Negative.    Skin: Negative.    Neurological: Negative.    Psychiatric/Behavioral: Negative.       OBJECTIVE:     Vital Signs (Most Recent)  Pulse: 94 (04/06/23 1455)  BP: 117/70 (04/06/23 1455)  SpO2: 100 % (04/06/23 1455)           Physical Exam:  Physical Exam  Constitutional:       General: She is not in acute distress.     Appearance: Normal appearance. She is normal weight. She is not ill-appearing, toxic-appearing or diaphoretic.   HENT:      Head: Normocephalic and atraumatic.   Eyes:      General: No scleral icterus.        Right eye: No discharge.         Left eye: No discharge.      Extraocular Movements: Extraocular movements intact.       Conjunctiva/sclera: Conjunctivae normal.   Neck:      Vascular: No carotid bruit.   Cardiovascular:      Rate and Rhythm: Normal rate and regular rhythm.      Pulses: Normal pulses.      Heart sounds: Normal heart sounds. No murmur heard.    No friction rub. No gallop.   Pulmonary:      Effort: Pulmonary effort is normal. No respiratory distress.      Breath sounds: Normal breath sounds. No stridor. No wheezing or rales.   Chest:      Chest wall: No tenderness.   Abdominal:      General: Bowel sounds are normal. There is no distension.      Palpations: Abdomen is soft. There is no mass.      Tenderness: There is no abdominal tenderness. There is no guarding or rebound.      Hernia: No hernia is present.   Musculoskeletal:         General: No swelling, tenderness, deformity or signs of injury. Normal range of motion.      Cervical back: Normal range of motion and neck supple. No rigidity or tenderness.      Right lower leg: No edema.      Left lower leg: No edema.   Lymphadenopathy:      Cervical: No cervical adenopathy.   Skin:     General: Skin is warm and dry.      Coloration: Skin is not jaundiced or pale.      Findings: No bruising, erythema, lesion or rash.   Neurological:      General: No focal deficit present.      Mental Status: She is alert and oriented to person, place, and time.      Motor: No weakness.      Coordination: Coordination normal.   Psychiatric:         Mood and Affect: Mood normal.         Behavior: Behavior normal.         Thought Content: Thought content normal.         Judgment: Judgment normal.       Laboratory      Diagnostic Results:  Ultrasound shows cholelithiasis without ductal dilatation    ASSESSMENT/PLAN:     Symptomatic gallbladder disease.  Natural history of symptomatic gallstones discussed.  Nature and purpose of procedure outlined.  Risks benefits and complications reviewed.  Consent signed for surgery          Discharged

## 2023-04-13 ENCOUNTER — ANESTHESIA EVENT (OUTPATIENT)
Dept: SURGERY | Facility: OTHER | Age: 33
End: 2023-04-13
Payer: COMMERCIAL

## 2023-04-13 ENCOUNTER — HOSPITAL ENCOUNTER (OUTPATIENT)
Dept: PREADMISSION TESTING | Facility: OTHER | Age: 33
Discharge: HOME | End: 2023-04-13
Attending: SPECIALIST
Payer: COMMERCIAL

## 2023-04-13 ENCOUNTER — CLINICAL SUPPORT (OUTPATIENT)
Dept: OTHER | Facility: CLINIC | Age: 33
End: 2023-04-13
Payer: COMMERCIAL

## 2023-04-13 VITALS
WEIGHT: 188 LBS | TEMPERATURE: 97 F | HEART RATE: 87 BPM | SYSTOLIC BLOOD PRESSURE: 118 MMHG | DIASTOLIC BLOOD PRESSURE: 76 MMHG | OXYGEN SATURATION: 100 % | HEIGHT: 62 IN | RESPIRATION RATE: 19 BRPM | BODY MASS INDEX: 34.6 KG/M2

## 2023-04-13 DIAGNOSIS — Z00.8 ENCOUNTER FOR OTHER GENERAL EXAMINATION: ICD-10-CM

## 2023-04-13 RX ORDER — HYDROXYZINE HYDROCHLORIDE 50 MG/1
50 TABLET, FILM COATED ORAL DAILY PRN
COMMUNITY

## 2023-04-13 RX ORDER — SODIUM CHLORIDE 0.9 % (FLUSH) 0.9 %
3 SYRINGE (ML) INJECTION
Status: CANCELLED | OUTPATIENT
Start: 2023-04-13

## 2023-04-13 RX ORDER — SODIUM CHLORIDE, SODIUM LACTATE, POTASSIUM CHLORIDE, CALCIUM CHLORIDE 600; 310; 30; 20 MG/100ML; MG/100ML; MG/100ML; MG/100ML
INJECTION, SOLUTION INTRAVENOUS CONTINUOUS
Status: CANCELLED | OUTPATIENT
Start: 2023-04-13

## 2023-04-13 RX ORDER — CIMETIDINE 300 MG/1
300 TABLET, FILM COATED ORAL 2 TIMES DAILY
COMMUNITY
Start: 2023-03-30 | End: 2023-12-28

## 2023-04-13 RX ORDER — MEPERIDINE HYDROCHLORIDE 25 MG/ML
12.5 INJECTION INTRAMUSCULAR; INTRAVENOUS; SUBCUTANEOUS ONCE AS NEEDED
Status: CANCELLED | OUTPATIENT
Start: 2023-04-13 | End: 2023-04-14

## 2023-04-13 RX ORDER — HYDROMORPHONE HYDROCHLORIDE 2 MG/ML
0.4 INJECTION, SOLUTION INTRAMUSCULAR; INTRAVENOUS; SUBCUTANEOUS EVERY 5 MIN PRN
Status: CANCELLED | OUTPATIENT
Start: 2023-04-13

## 2023-04-13 RX ORDER — OXYCODONE HYDROCHLORIDE 5 MG/1
5 TABLET ORAL
Status: CANCELLED | OUTPATIENT
Start: 2023-04-13

## 2023-04-13 RX ORDER — PROCHLORPERAZINE EDISYLATE 5 MG/ML
5 INJECTION INTRAMUSCULAR; INTRAVENOUS EVERY 30 MIN PRN
Status: CANCELLED | OUTPATIENT
Start: 2023-04-13

## 2023-04-13 RX ORDER — ACETAMINOPHEN 500 MG
1000 TABLET ORAL
Status: CANCELLED | OUTPATIENT
Start: 2023-04-13 | End: 2023-04-13

## 2023-04-13 NOTE — ANESTHESIA PREPROCEDURE EVALUATION
04/13/2023  Jennifer Stark is a 32 y.o., female.      Pre-op Assessment    I have reviewed the Patient Summary Reports.     I have reviewed the Nursing Notes. I have reviewed the NPO Status.   I have reviewed the Medications.     Review of Systems  Anesthesia Hx:  No problems with previous Anesthesia  Denies Family Hx of Anesthesia complications.   Denies Personal Hx of Anesthesia complications.   Social:  Non-Smoker    Hematology/Oncology:  Hematology Normal   Oncology Normal     EENT/Dental:EENT/Dental Normal   Cardiovascular:  Cardiovascular Normal Exercise tolerance: good     Pulmonary:  Pulmonary Normal    Renal/:  Renal/ Normal     Musculoskeletal:  Musculoskeletal Normal    Neurological:  Neurology Normal    Endocrine:  Endocrine Normal  Obesity / BMI > 30  Dermatological:  Skin Normal    Psych:  Psychiatric Normal           Physical Exam  General: Well nourished, Cooperative, Oriented and Alert    Airway:  Mouth Opening: Normal  TM Distance: Normal  Neck ROM: Normal ROM    Dental:  Intact        Anesthesia Plan  Type of Anesthesia, risks & benefits discussed:    Anesthesia Type: Gen ETT  Intra-op Monitoring Plan: Standard ASA Monitors  Post Op Pain Control Plan: multimodal analgesia and peripheral nerve block  Induction:  IV  Airway Plan: Video and Direct  Informed Consent: Informed consent signed with the Patient and all parties understand the risks and agree with anesthesia plan.  All questions answered.   ASA Score: 2  Day of Surgery Review of History & Physical: H&P Update referred to the surgeon/provider.  Anesthesia Plan Notes: Labs in epic  Jehovahs witness.  NO BLOOD    Ready For Surgery From Anesthesia Perspective.     .

## 2023-04-13 NOTE — DISCHARGE INSTRUCTIONS
Information to Prepare you for your Surgery    PRE-ADMIT TESTING -  [phone]    [address] [facility]          Your surgery has been scheduled at [facility]. We are pleased to have the opportunity to serve you. For Further Information please call [phone].    On the day of surgery please report to the Information Desk on the 1st floor.    CONTACT YOUR PHYSICIAN'S OFFICE THE DAY PRIOR TO YOUR SURGERY TO OBTAIN YOUR ARRIVAL TIME.     The evening before surgery do not eat anything after 9 p.m. ( this includes hard candy, chewing gum and mints).  You may only have GATORADE, POWERADE AND WATER  from 9 p.m. until you leave your home.   DO NOT DRINK ANY LIQUIDS ON THE WAY TO THE HOSPITAL.      Why does your anesthesiologist allow you to drink Gatorade/Powerade before surgery?  Gatorade/Powerade helps to increase your comfort before surgery and to decrease your nausea after surgery. The carbohydrates in Gatorade/Powerade help reduce your body's stress response to surgery.  If you are a diabetic-drink only water prior to surgery.       Patients may have 2 visitors pre and post procedure. Only 2 visitors will be allowed in the Surgical building with the patient. No one under the age of 12 will be allowed into the facility.    SPECIAL MEDICATION INSTRUCTIONS: TAKE medications checked off by the Anesthesiologist on your Medication List.    Angiogram Patients: Take medications as instructed by your physician, including aspirin.     Surgery Patients:    If you take ASPIRIN - Your PHYSICIAN/SURGEON will need to inform you IF/OR when you need to stop taking aspirin prior to your surgery.     The week prior to surgery do not ot take any medications containing IBUPROFEN or NSAIDS ( Advil, Motrin, Goodys, BC, Aleve, Naproxen etc) If you are not sure if you should take a medicine please call your surgeon's office.  Ok to take Tylenol    Do Not Wear any make-up  (especially eye make-up) to surgery. Please remove any false eyelashes or eyelash extensions. If you arrive the day of surgery with makeup/eyelashes on you will be required to remove prior to surgery. (There is a risk of corneal abrasions if eye makeup/eyelash extensions are not removed)      Leave all valuables at home.   Do Not wear any jewelry or watches, including any metal in body piercings. Jewelry must be removed prior to coming to the hospital.  There is a possibility that rings that are unable to be removed may be cut off if they are on the surgical extremity.    Please remove all hair extensions, wigs, clips and any other metal accessories/ ornaments from your hair.  These items may pose a flammable/fire risk in Surgery and must be removed.    Do not shave your surgical area at least 5 days prior to your surgery. The surgical prep will be performed at the hospital according to Infection Control regulations.    Contact Lens must be removed before surgery. Either do not wear the contact lens or bring a case and solution for storage.  Please bring a container for eyeglasses or dentures as required.  Bring any paperwork your physician has provided, such as consent forms,  history and physicals, doctor's orders, etc.   Bring comfortable clothes that are loose fitting to wear upon discharge. Take into consideration the type of surgery being performed.  Maintain your diet as advised per your physician the day prior to surgery.      Adequate rest the night before surgery is advised.   Park in the Parking lot behind the hospital or in the [place] [facility] across the street from the parking lot. Parking is complimentary.  If you will be discharged the same day as your procedure, please arrange for a responsible adult to drive you home or to accompany you if traveling by taxi.   YOU WILL NOT BE PERMITTED TO DRIVE OR TO LEAVE THE HOSPITAL ALONE AFTER SURGERY.   If you are being discharged the same day, it is  strongly recommended that you arrange for someone to remain with you for the first 24 hrs following your surgery.    The Surgeon will speak to your family/visitor after your surgery regarding the outcome of your surgery and post op care.  The Surgeon may speak to you after your surgery, but there is a possibility you may not remember the details.  Please check with your family members regarding the conversation with the Surgeon.    We strongly recommend whoever is bringing you home be present for discharge instructions.  This will ensure a thorough understanding for your post op home care.    ALL CHILDREN MUST ALWAYS BE ACCOMPANIED BY AN ADULT.    Visitors-Refer to current Visitor policy handouts.    Thank you for your cooperation.  The Staff of [facility].            Bathing Instructions with Hibiclens    Shower the evening before and morning of your procedure with Chlorhexidine (Hibiclens)  do not use Chlorhexidine on your face or genitals. Do not get in your eyes.  Wash your face with water and your regular face wash/soap  Use your regular shampoo  Apply Chlorhexidine (Hibiclens) directly on your skin or on a wet washcloth and wash gently. When showering: Move away from the shower stream when applying Chlorhexidine (Hibiclens) to avoid rinsing off too soon.  Rinse thoroughly with warm water  Do not dilute Chlorhexidine (Hibiclens)   Dry off as usual, do not use any deodorant, powder, body lotions, perfume, after shave or cologne.

## 2023-04-14 VITALS
WEIGHT: 188 LBS | DIASTOLIC BLOOD PRESSURE: 80 MMHG | HEIGHT: 62 IN | SYSTOLIC BLOOD PRESSURE: 112 MMHG | BODY MASS INDEX: 34.6 KG/M2

## 2023-04-14 LAB
HDLC SERPL-MCNC: 39 MG/DL
POC CHOLESTEROL, LDL (DOCK): 107 MG/DL
POC CHOLESTEROL, TOTAL: 160 MG/DL
POC GLUCOSE, FASTING: 107 MG/DL (ref 60–110)
TRIGL SERPL-MCNC: 70 MG/DL

## 2023-04-19 ENCOUNTER — ANESTHESIA (OUTPATIENT)
Dept: SURGERY | Facility: OTHER | Age: 33
End: 2023-04-19
Payer: COMMERCIAL

## 2023-04-19 ENCOUNTER — HOSPITAL ENCOUNTER (OUTPATIENT)
Facility: OTHER | Age: 33
Discharge: HOME | End: 2023-04-19
Attending: SPECIALIST | Admitting: SPECIALIST
Payer: COMMERCIAL

## 2023-04-19 DIAGNOSIS — K82.9 GALLBLADDER ATTACK: ICD-10-CM

## 2023-04-19 DIAGNOSIS — K80.20 CALCULUS OF GALLBLADDER WITHOUT CHOLECYSTITIS WITHOUT OBSTRUCTION: Primary | ICD-10-CM

## 2023-04-19 LAB
B-HCG UR QL: NEGATIVE
CTP QC/QA: YES

## 2023-04-19 PROCEDURE — 88304 PR  SURG PATH,LEVEL III: ICD-10-PCS | Mod: 26,,, | Performed by: PATHOLOGY

## 2023-04-19 PROCEDURE — 27201423 OPTIME MED/SURG SUP & DEVICES STERILE SUPPLY: Performed by: SPECIALIST

## 2023-04-19 PROCEDURE — 71000015 HC POSTOP RECOV 1ST HR: Performed by: SPECIALIST

## 2023-04-19 PROCEDURE — 63600175 PHARM REV CODE 636 W HCPCS: Performed by: SPECIALIST

## 2023-04-19 PROCEDURE — 71000039 HC RECOVERY, EACH ADD'L HOUR: Performed by: SPECIALIST

## 2023-04-19 PROCEDURE — 71000033 HC RECOVERY, INTIAL HOUR: Performed by: SPECIALIST

## 2023-04-19 PROCEDURE — 63600175 PHARM REV CODE 636 W HCPCS: Performed by: NURSE ANESTHETIST, CERTIFIED REGISTERED

## 2023-04-19 PROCEDURE — C9290 INJ, BUPIVACAINE LIPOSOME: HCPCS | Performed by: ANESTHESIOLOGY

## 2023-04-19 PROCEDURE — 88304 TISSUE EXAM BY PATHOLOGIST: CPT | Mod: 26,,, | Performed by: PATHOLOGY

## 2023-04-19 PROCEDURE — 81025 URINE PREGNANCY TEST: CPT | Performed by: ANESTHESIOLOGY

## 2023-04-19 PROCEDURE — 47562 PR LAP,CHOLECYSTECTOMY: ICD-10-PCS | Mod: ,,, | Performed by: SPECIALIST

## 2023-04-19 PROCEDURE — 71000016 HC POSTOP RECOV ADDL HR: Performed by: SPECIALIST

## 2023-04-19 PROCEDURE — 63600175 PHARM REV CODE 636 W HCPCS: Performed by: ANESTHESIOLOGY

## 2023-04-19 PROCEDURE — 37000009 HC ANESTHESIA EA ADD 15 MINS: Performed by: SPECIALIST

## 2023-04-19 PROCEDURE — 63600175 PHARM REV CODE 636 W HCPCS

## 2023-04-19 PROCEDURE — 88304 TISSUE EXAM BY PATHOLOGIST: CPT | Performed by: PATHOLOGY

## 2023-04-19 PROCEDURE — 25000003 PHARM REV CODE 250: Performed by: NURSE ANESTHETIST, CERTIFIED REGISTERED

## 2023-04-19 PROCEDURE — 64486 TAP BLOCK UNIL BY INJECTION: CPT | Performed by: ANESTHESIOLOGY

## 2023-04-19 PROCEDURE — C1729 CATH, DRAINAGE: HCPCS | Performed by: SPECIALIST

## 2023-04-19 PROCEDURE — 36000709 HC OR TIME LEV III EA ADD 15 MIN: Performed by: SPECIALIST

## 2023-04-19 PROCEDURE — 37000008 HC ANESTHESIA 1ST 15 MINUTES: Performed by: SPECIALIST

## 2023-04-19 PROCEDURE — 36000708 HC OR TIME LEV III 1ST 15 MIN: Performed by: SPECIALIST

## 2023-04-19 PROCEDURE — 64486 TAP BLOCK UNIL BY INJECTION: CPT | Mod: 59 | Performed by: ANESTHESIOLOGY

## 2023-04-19 PROCEDURE — 47562 LAPAROSCOPIC CHOLECYSTECTOMY: CPT | Mod: ,,, | Performed by: SPECIALIST

## 2023-04-19 PROCEDURE — 25000003 PHARM REV CODE 250: Performed by: ANESTHESIOLOGY

## 2023-04-19 RX ORDER — HYDROMORPHONE HYDROCHLORIDE 2 MG/ML
0.4 INJECTION, SOLUTION INTRAMUSCULAR; INTRAVENOUS; SUBCUTANEOUS EVERY 5 MIN PRN
Status: CANCELLED | OUTPATIENT
Start: 2023-04-19

## 2023-04-19 RX ORDER — LIDOCAINE HYDROCHLORIDE 10 MG/ML
1 INJECTION, SOLUTION EPIDURAL; INFILTRATION; INTRACAUDAL; PERINEURAL ONCE
Status: DISCONTINUED | OUTPATIENT
Start: 2023-04-19 | End: 2023-04-19 | Stop reason: HOSPADM

## 2023-04-19 RX ORDER — PROCHLORPERAZINE EDISYLATE 5 MG/ML
5 INJECTION INTRAMUSCULAR; INTRAVENOUS EVERY 30 MIN PRN
Status: CANCELLED | OUTPATIENT
Start: 2023-04-19

## 2023-04-19 RX ORDER — KETOROLAC TROMETHAMINE 30 MG/ML
INJECTION, SOLUTION INTRAMUSCULAR; INTRAVENOUS
Status: DISCONTINUED | OUTPATIENT
Start: 2023-04-19 | End: 2023-04-19

## 2023-04-19 RX ORDER — DEXAMETHASONE SODIUM PHOSPHATE 4 MG/ML
INJECTION, SOLUTION INTRA-ARTICULAR; INTRALESIONAL; INTRAMUSCULAR; INTRAVENOUS; SOFT TISSUE
Status: DISCONTINUED | OUTPATIENT
Start: 2023-04-19 | End: 2023-04-19

## 2023-04-19 RX ORDER — MIDAZOLAM HYDROCHLORIDE 1 MG/ML
INJECTION INTRAMUSCULAR; INTRAVENOUS
Status: DISCONTINUED | OUTPATIENT
Start: 2023-04-19 | End: 2023-04-19

## 2023-04-19 RX ORDER — DIPHENHYDRAMINE HYDROCHLORIDE 50 MG/ML
25 INJECTION INTRAMUSCULAR; INTRAVENOUS EVERY 6 HOURS PRN
Status: DISCONTINUED | OUTPATIENT
Start: 2023-04-19 | End: 2023-04-19 | Stop reason: HOSPADM

## 2023-04-19 RX ORDER — PROCHLORPERAZINE EDISYLATE 5 MG/ML
5 INJECTION INTRAMUSCULAR; INTRAVENOUS EVERY 30 MIN PRN
Status: DISCONTINUED | OUTPATIENT
Start: 2023-04-19 | End: 2023-04-19 | Stop reason: HOSPADM

## 2023-04-19 RX ORDER — CIPROFLOXACIN 2 MG/ML
400 INJECTION, SOLUTION INTRAVENOUS
Status: COMPLETED | OUTPATIENT
Start: 2023-04-19 | End: 2023-04-19

## 2023-04-19 RX ORDER — FENTANYL CITRATE 50 UG/ML
INJECTION, SOLUTION INTRAMUSCULAR; INTRAVENOUS
Status: DISCONTINUED | OUTPATIENT
Start: 2023-04-19 | End: 2023-04-19

## 2023-04-19 RX ORDER — MEPERIDINE HYDROCHLORIDE 25 MG/ML
12.5 INJECTION INTRAMUSCULAR; INTRAVENOUS; SUBCUTANEOUS ONCE AS NEEDED
Status: DISCONTINUED | OUTPATIENT
Start: 2023-04-19 | End: 2023-04-19 | Stop reason: HOSPADM

## 2023-04-19 RX ORDER — ACETAMINOPHEN 500 MG
1000 TABLET ORAL
Status: COMPLETED | OUTPATIENT
Start: 2023-04-19 | End: 2023-04-19

## 2023-04-19 RX ORDER — SODIUM CHLORIDE 0.9 % (FLUSH) 0.9 %
3 SYRINGE (ML) INJECTION
Status: DISCONTINUED | OUTPATIENT
Start: 2023-04-19 | End: 2023-04-19 | Stop reason: HOSPADM

## 2023-04-19 RX ORDER — PROPOFOL 10 MG/ML
VIAL (ML) INTRAVENOUS
Status: DISCONTINUED | OUTPATIENT
Start: 2023-04-19 | End: 2023-04-19

## 2023-04-19 RX ORDER — HYDROMORPHONE HYDROCHLORIDE 2 MG/ML
0.4 INJECTION, SOLUTION INTRAMUSCULAR; INTRAVENOUS; SUBCUTANEOUS EVERY 5 MIN PRN
Status: DISCONTINUED | OUTPATIENT
Start: 2023-04-19 | End: 2023-04-19 | Stop reason: HOSPADM

## 2023-04-19 RX ORDER — ROCURONIUM BROMIDE 10 MG/ML
INJECTION, SOLUTION INTRAVENOUS
Status: DISCONTINUED | OUTPATIENT
Start: 2023-04-19 | End: 2023-04-19

## 2023-04-19 RX ORDER — OXYCODONE AND ACETAMINOPHEN 7.5; 325 MG/1; MG/1
1 TABLET ORAL EVERY 6 HOURS PRN
Qty: 28 TABLET | Refills: 0 | Status: SHIPPED | OUTPATIENT
Start: 2023-04-19 | End: 2023-12-28

## 2023-04-19 RX ORDER — OXYCODONE HYDROCHLORIDE 5 MG/1
5 TABLET ORAL
Status: CANCELLED | OUTPATIENT
Start: 2023-04-19

## 2023-04-19 RX ORDER — PHENYLEPHRINE HYDROCHLORIDE 10 MG/ML
INJECTION INTRAVENOUS
Status: DISCONTINUED | OUTPATIENT
Start: 2023-04-19 | End: 2023-04-19

## 2023-04-19 RX ORDER — MEPERIDINE HYDROCHLORIDE 25 MG/ML
12.5 INJECTION INTRAMUSCULAR; INTRAVENOUS; SUBCUTANEOUS ONCE AS NEEDED
Status: CANCELLED | OUTPATIENT
Start: 2023-04-19 | End: 2023-04-20

## 2023-04-19 RX ORDER — SODIUM CHLORIDE 9 MG/ML
INJECTION, SOLUTION INTRAVENOUS CONTINUOUS
Status: DISCONTINUED | OUTPATIENT
Start: 2023-04-19 | End: 2023-04-19 | Stop reason: HOSPADM

## 2023-04-19 RX ORDER — OXYCODONE HYDROCHLORIDE 5 MG/1
5 TABLET ORAL
Status: DISCONTINUED | OUTPATIENT
Start: 2023-04-19 | End: 2023-04-19 | Stop reason: HOSPADM

## 2023-04-19 RX ORDER — ONDANSETRON HYDROCHLORIDE 2 MG/ML
INJECTION, SOLUTION INTRAMUSCULAR; INTRAVENOUS
Status: DISCONTINUED | OUTPATIENT
Start: 2023-04-19 | End: 2023-04-19

## 2023-04-19 RX ORDER — SODIUM CHLORIDE, SODIUM LACTATE, POTASSIUM CHLORIDE, CALCIUM CHLORIDE 600; 310; 30; 20 MG/100ML; MG/100ML; MG/100ML; MG/100ML
INJECTION, SOLUTION INTRAVENOUS CONTINUOUS
Status: DISCONTINUED | OUTPATIENT
Start: 2023-04-19 | End: 2023-04-19 | Stop reason: HOSPADM

## 2023-04-19 RX ORDER — METRONIDAZOLE 500 MG/100ML
500 INJECTION, SOLUTION INTRAVENOUS
Status: DISCONTINUED | OUTPATIENT
Start: 2023-04-19 | End: 2023-04-19 | Stop reason: HOSPADM

## 2023-04-19 RX ORDER — SODIUM CHLORIDE 0.9 % (FLUSH) 0.9 %
10 SYRINGE (ML) INJECTION
Status: DISCONTINUED | OUTPATIENT
Start: 2023-04-19 | End: 2023-04-19 | Stop reason: HOSPADM

## 2023-04-19 RX ORDER — LIDOCAINE HYDROCHLORIDE 20 MG/ML
INJECTION INTRAVENOUS
Status: DISCONTINUED | OUTPATIENT
Start: 2023-04-19 | End: 2023-04-19

## 2023-04-19 RX ADMIN — FENTANYL CITRATE 100 MCG: 50 INJECTION, SOLUTION INTRAMUSCULAR; INTRAVENOUS at 08:04

## 2023-04-19 RX ADMIN — KETOROLAC TROMETHAMINE 30 MG: 30 INJECTION, SOLUTION INTRAMUSCULAR; INTRAVENOUS at 09:04

## 2023-04-19 RX ADMIN — LIDOCAINE HYDROCHLORIDE 50 MG: 20 INJECTION, SOLUTION INTRAVENOUS at 08:04

## 2023-04-19 RX ADMIN — SUGAMMADEX 200 MG: 100 INJECTION, SOLUTION INTRAVENOUS at 09:04

## 2023-04-19 RX ADMIN — CIPROFLOXACIN 400 MG: 2 INJECTION, SOLUTION INTRAVENOUS at 08:04

## 2023-04-19 RX ADMIN — MIDAZOLAM HYDROCHLORIDE 2 MG: 1 INJECTION, SOLUTION INTRAMUSCULAR; INTRAVENOUS at 08:04

## 2023-04-19 RX ADMIN — SODIUM CHLORIDE, SODIUM LACTATE, POTASSIUM CHLORIDE, AND CALCIUM CHLORIDE: 600; 310; 30; 20 INJECTION, SOLUTION INTRAVENOUS at 07:04

## 2023-04-19 RX ADMIN — ROCURONIUM BROMIDE 50 MG: 10 SOLUTION INTRAVENOUS at 08:04

## 2023-04-19 RX ADMIN — PHENYLEPHRINE HYDROCHLORIDE 200 MCG: 10 INJECTION INTRAVENOUS at 09:04

## 2023-04-19 RX ADMIN — CARBOXYMETHYLCELLULOSE SODIUM 2 DROP: 2.5 SOLUTION/ DROPS OPHTHALMIC at 08:04

## 2023-04-19 RX ADMIN — DEXAMETHASONE SODIUM PHOSPHATE 8 MG: 4 INJECTION, SOLUTION INTRAMUSCULAR; INTRAVENOUS at 08:04

## 2023-04-19 RX ADMIN — ONDANSETRON 4 MG: 2 INJECTION INTRAMUSCULAR; INTRAVENOUS at 09:04

## 2023-04-19 RX ADMIN — ACETAMINOPHEN 1000 MG: 500 TABLET, FILM COATED ORAL at 07:04

## 2023-04-19 RX ADMIN — GLYCOPYRROLATE 0.2 MG: 0.2 INJECTION, SOLUTION INTRAMUSCULAR; INTRAVITREAL at 08:04

## 2023-04-19 RX ADMIN — BUPIVACAINE 20 ML: 13.3 INJECTION, SUSPENSION, LIPOSOMAL INFILTRATION at 08:04

## 2023-04-19 RX ADMIN — PROPOFOL 200 MG: 10 INJECTION, EMULSION INTRAVENOUS at 08:04

## 2023-04-19 RX ADMIN — PHENYLEPHRINE HYDROCHLORIDE 200 MCG: 10 INJECTION INTRAVENOUS at 08:04

## 2023-04-19 NOTE — ANESTHESIA PROCEDURE NOTES
Right subcostal TAP    Patient location during procedure: floor   Block not for primary anesthetic.  Reason for block: at surgeon's request and post-op pain management   Post-op Pain Location: Upper abdomen,    Timeout: 4/19/2023 8:02 AM   End time: 4/19/2023 8:06 AM    Staffing  Authorizing Provider: Juan Manuel Fields MD  Performing Provider: Juan Manuel Fields MD    Preanesthetic Checklist  Completed: patient identified, IV checked, site marked, risks and benefits discussed, surgical consent, monitors and equipment checked, pre-op evaluation and timeout performed  Peripheral Block  Patient position: supine  Prep: ChloraPrep and site prepped and draped  Patient monitoring: heart rate, cardiac monitor, continuous pulse ox, continuous capnometry and frequent blood pressure checks  Block type: TAP (Subcostal)  Laterality: right  Injection technique: single shot  Needle  Needle type: Echogenic   Needle gauge: 20 G  Needle length: 4 in  Needle localization: anatomical landmarks and ultrasound guidance   -ultrasound image captured on disc.  Assessment  Injection assessment: negative aspiration, negative parasthesia and local visualized surrounding nerve  Paresthesia pain: none  Heart rate change: no  Slow fractionated injection: yes  Pain Tolerance: comfortable throughout block and no complaints      Additional Notes  Local visualized to spread between internal oblique and transversus abdominis via subcostal approach  Right side injected with 20 cc 0.25% Marcaine plus 10 cc Exparel

## 2023-04-19 NOTE — ANESTHESIA PROCEDURE NOTES
Intubation    Date/Time: 4/19/2023 8:39 AM  Performed by: Rajwinder Fajardo CRNA  Authorized by: Paolo Dixon MD     Intubation:     Induction:  Intravenous    Intubated:  Postinduction    Mask Ventilation:  Easy mask    Attempts:  1    Attempted By:  CRNA    Method of Intubation:  Video laryngoscopy    Blade:  Beard 3    Laryngeal View Grade: Grade I - full view of cords      Difficult Airway Encountered?: No      Complications:  None    Airway Device:  Oral endotracheal tube    Airway Device Size:  7.0    Style/Cuff Inflation:  Cuffed    Inflation Amount (mL):  3    Tube secured:  22    Secured at:  The lips    Placement Verified By:  Capnometry    Complicating Factors:  None    Findings Post-Intubation:  BS equal bilateral

## 2023-04-19 NOTE — ANESTHESIA PROCEDURE NOTES
Right TAP    Patient location during procedure: holding area   Block not for primary anesthetic.  Reason for block: at surgeon's request and post-op pain management   Post-op Pain Location: abdomen   Start time: 4/19/2023 8:07 AM  Timeout: 4/19/2023 8:02 AM   End time: 4/19/2023 8:10 AM    Staffing  Authorizing Provider: Juan Manuel Fields MD  Performing Provider: Juan Manuel Fields MD    Preanesthetic Checklist  Completed: patient identified, IV checked, site marked, risks and benefits discussed, surgical consent, monitors and equipment checked, pre-op evaluation and timeout performed  Peripheral Block  Patient position: supine  Prep: ChloraPrep and site prepped and draped  Patient monitoring: heart rate and continuous pulse ox  Block type: TAP  Laterality: right  Injection technique: single shot  Needle  Needle type: Echogenic   Needle gauge: 20 G  Needle length: 4 in  Needle localization: anatomical landmarks and ultrasound guidance   -ultrasound image captured on disc.  Assessment  Injection assessment: negative aspiration, negative parasthesia and local visualized surrounding nerve  Paresthesia pain: none  Heart rate change: no  Slow fractionated injection: yes  Pain Tolerance: comfortable throughout block and no complaints      Additional Notes  Local visualized to spread between internal oblique and transversus abdominis  Right side injected with 20 cc 0.25% Marcaine plus 10 cc Exparel

## 2023-04-19 NOTE — ANESTHESIA POSTPROCEDURE EVALUATION
Anesthesia Post Evaluation    Patient: Jennifer Stark    Procedure(s) Performed: Procedure(s) (LRB):  CHOLECYSTECTOMY, LAPAROSCOPIC (N/A)    Final Anesthesia Type: general      Patient location during evaluation: PACU  Patient participation: Yes- Able to Participate  Level of consciousness: awake and alert  Post-procedure vital signs: reviewed and stable  Pain management: adequate  Airway patency: patent    PONV status at discharge: No PONV  Anesthetic complications: no      Cardiovascular status: blood pressure returned to baseline  Respiratory status: unassisted, spontaneous ventilation and room air  Hydration status: euvolemic  Follow-up not needed.          Vitals Value Taken Time   /69 04/19/23 1043   Temp 36.3 °C (97.3 °F) 04/19/23 0955   Pulse 78 04/19/23 1054   Resp 16 04/19/23 1040   SpO2 100 % 04/19/23 1054   Vitals shown include unvalidated device data.      No case tracking events are documented in the log.      Pain/Reny Score: Pain Rating Prior to Med Admin: 0 (4/19/2023  7:31 AM)  Reny Score: 10 (4/19/2023 10:25 AM)

## 2023-04-19 NOTE — OR NURSING
VSS on RA. Denies pain. X4 lap sites with steri strips CDI. PIV CDI. Meets PACU discharge criteria. Ready for transfer to phase II. Family notified.

## 2023-04-19 NOTE — OP NOTE
[facility], [place] - Surgery ([place])  Operative Note    SUMMARY     Surgery Date: 4/19/2023     Surgeon(s) and Role:     * Elijah Bettencourt Jr., MD - Primary    Assisting Surgeon: None    Pre-op Diagnosis:  Calculus of gallbladder without cholecystitis without obstruction [K80.20]    Post-op Diagnosis:  Post-Op Diagnosis Codes:     * Calculus of gallbladder without cholecystitis without obstruction [K80.20]    Procedure(s) (LRB):  CHOLECYSTECTOMY, LAPAROSCOPIC (N/A)    Anesthesia: General    Description of Procedure:  The patient was brought to the operating room and placed in supine position.  The abdomen prepped and draped in a sterile fashion.  A small incision made at the umbilicus, a Veress needle inserted, a pneumoperitoneum achieved.  A 10 mm Optiview trocar inserted at the umbilicus.  Under direct observation 3 additional trocars were inserted:  A 5 mm trocar in the upper midline, a 5 mm trocar in the anterior axillary line of the right upper quadrant, a 5 mm trocar in the midclavicular line of the right upper quadrant.  The gallbladder was visualized and seen to be chronically inflamed.  The gallbladder was grasped with ratcheted retractors placed through the lateral 5 mm ports.  Using blunt dissection adhesions to the gallbladder were taken down exposing the cystic artery and duct.  These were doubly clamped proximally and then transected.  Gallbladder was removed from the liver bed with the aid of the electrocautery.  Under direct observation with the aid of an Endo-Catch the gallbladder was removed through the periumbilical port.  The peritoneal cavity irrigated and then inspected.  The pneumoperitoneum released.  The fascia umbilicus closed with 0 Vicryl and the skin with 4-0 Monocryl.  The wounds sterilely dressed.  The patient tolerated the procedure well left the operating room in good condition.  At the end of procedure all sponge lap instrument counts were correct.    Estimated Blood Loss:  Minimal          Specimens:   Specimen (24h ago, onward)       Start     Ordered    04/19/23 0912  Specimen to Pathology, Surgery General Surgery  Once        Comments: gallbladder     References:    Click here for ordering Quick Tip   Question Answer Comment   Procedure Type: General Surgery    Specimen Class: Routine/Screening        04/19/23 0911                        SUMMARY     Admit Date:     Discharge Date and Time:  04/19/2023 10:06 AM    Hospital Course (synopsis of major diagnoses, care, treatment, and services provided during the course of the hospital stay): benign     Final Diagnosis: Post-Op Diagnosis Codes:     * Calculus of gallbladder without cholecystitis without obstruction [K80.20]    Disposition: Home or Self Care    Follow Up/Patient Instructions: office 10-14 days    Medications:  Reconciled Home Medications:      Medication List        START taking these medications      oxyCODONE-acetaminophen 7.5-325 mg per tablet  Commonly known as: PERCOCET  Take 1 tablet by mouth every 6 (six) hours as needed.            CHANGE how you take these medications      dicyclomine 20 mg tablet  Commonly known as: BENTYL  Take 1 tablet (20 mg total) by mouth 2 (two) times daily.  What changed: when to take this            CONTINUE taking these medications      cimetidine 300 MG tablet  Commonly known as: TAGAMET  Take 300 mg by mouth 2 (two) times daily.     hydrOXYzine 50 MG tablet  Commonly known as: ATARAX  Take 50 mg by mouth daily as needed.     omeprazole 40 MG capsule  Commonly known as: PRILOSEC  Take 1 capsule (40 mg total) by mouth once daily.     ondansetron 4 MG Tbdl  Commonly known as: ZOFRAN-ODT  Take 1 tablet (4 mg total) by mouth every 8 (eight) hours as needed (Nausea).     SLYND 4 mg (28) Tab  Generic drug: drospirenone (contraceptive)  Take 1 tablet (4 mg total) by mouth once daily.     sumatriptan 50 MG tablet  Commonly known as: IMITREX  Take 1 tablet (50 mg total) by mouth every 2 (two) hours as needed  for Migraine (Take 2 doses maximum per day).            Discharge Procedure Orders   Diet general     Call MD for:  temperature >100.4     Call MD for:  persistent nausea and vomiting     Call MD for:  severe uncontrolled pain     Call MD for:  difficulty breathing, headache or visual disturbances     Call MD for:  redness, tenderness, or signs of infection (pain, swelling, redness, odor or green/yellow discharge around incision site)     Lifting restrictions   Order Comments: 10 lb  No heavy lifting, pulling, pushing     Wound care routine (specify)   Order Comments: Wound care routine:   Leave steri strips intact  Mat shower      Follow-up Information       Elijah Bettencourt Jr, MD Follow up in 10 day(s).    Specialties: General Surgery, Vascular Surgery  Contact information:  [address]  [address]  [facility] [address] [phone]               Elijah Bettencourt Jr, MD Follow up.    Specialties: General Surgery, Vascular Surgery  Contact information:  [address]  [address]  [facility] [address] [phone]

## 2023-04-19 NOTE — H&P
History & Physical     SUBJECTIVE:      History of Present Illness:  Patient is a 32 y.o. female presents with history of postprandial epigastric and right upper quadrant pain.  Patient seen in emergency room and imaging study showed gallstones.  No fever, chills, jaundice         Chief Complaint   Patient presents with    Gall Bladder Problem               Review of patient's allergies indicates:   Allergen Reactions    Pcn [penicillins] Other (See Comments)       Uncertain of reaction - just told from her mother that since she was little she is allergic                Current Outpatient Medications   Medication Sig Dispense Refill    dicyclomine (BENTYL) 20 mg tablet Take 1 tablet (20 mg total) by mouth 2 (two) times daily. 20 tablet 0    drospirenone, contraceptive, (SLYND) 4 mg (28) Tab Take 1 tablet (4 mg total) by mouth once daily. 28 tablet 0    ibuprofen (ADVIL,MOTRIN) 200 MG tablet Take 2 tablets (400 mg total) by mouth every 6 (six) hours as needed for Pain.   0    ibuprofen (ADVIL,MOTRIN) 600 MG tablet Take 1 tablet (600 mg total) by mouth every 6 (six) hours as needed for Pain. 20 tablet 0    omeprazole (PRILOSEC) 40 MG capsule Take 1 capsule (40 mg total) by mouth once daily. 30 capsule 0    ondansetron (ZOFRAN-ODT) 4 MG TbDL Take 1 tablet (4 mg total) by mouth every 8 (eight) hours as needed (Nausea). 15 tablet 0    sumatriptan (IMITREX) 50 MG tablet Take 1 tablet (50 mg total) by mouth every 2 (two) hours as needed for Migraine (Take 2 doses maximum per day). 30 tablet 0      No current facility-administered medications for this visit.              Past Medical History:   Diagnosis Date    Abnormal cervical Papanicolaou smear 2009     repeat paps    PCOS (polycystic ovarian syndrome)       Metformin late 2015, stopped 9/2018    Prenatal care in second trimester 10/15/2018            Past Surgical History:   Procedure Laterality Date    KNEE SURGERY   2003    WISDOM TOOTH EXTRACTION                 Family History   Problem Relation Age of Onset    No Known Problems Father      Thyroid disease Mother      No Known Problems Sister      No Known Problems Brother      No Known Problems Maternal Aunt      No Known Problems Maternal Uncle      No Known Problems Paternal Aunt      No Known Problems Paternal Uncle      Cataracts Maternal Grandmother      Hypertension Maternal Grandmother      Stroke Maternal Grandfather      No Known Problems Paternal Grandmother      Lung cancer Paternal Grandfather           Environmental    Breast cancer Neg Hx      Colon cancer Neg Hx      Ovarian cancer Neg Hx      Amblyopia Neg Hx      Blindness Neg Hx      Cancer Neg Hx      Diabetes Neg Hx      Glaucoma Neg Hx      Macular degeneration Neg Hx      Retinal detachment Neg Hx      Strabismus Neg Hx        Social History            Tobacco Use    Smoking status: Never    Smokeless tobacco: Never   Substance Use Topics    Alcohol use: Yes    Drug use: No       Comment: None with pregnancy         Review of Systems:  Review of Systems   Constitutional: Negative.  Negative for fatigue and fever.   HENT: Negative.  Negative for sore throat and trouble swallowing.    Eyes: Negative.    Respiratory: Negative.     Cardiovascular: Negative.  Negative for chest pain.   Gastrointestinal:  Positive for abdominal distention and abdominal pain.   Genitourinary: Negative.    Musculoskeletal: Negative.    Skin: Negative.    Neurological: Negative.    Psychiatric/Behavioral: Negative.        OBJECTIVE:      Vital Signs (Most Recent)  Pulse: 94 (04/06/23 1455)  BP: 117/70 (04/06/23 1455)  SpO2: 100 % (04/06/23 1455)         Physical Exam:  Physical Exam  Constitutional:       General: She is not in acute distress.     Appearance: Normal appearance. She is normal weight. She is not ill-appearing, toxic-appearing or diaphoretic.   HENT:      Head: Normocephalic and atraumatic.   Eyes:      General: No scleral icterus.        Right eye: No  discharge.         Left eye: No discharge.      Extraocular Movements: Extraocular movements intact.      Conjunctiva/sclera: Conjunctivae normal.   Neck:      Vascular: No carotid bruit.   Cardiovascular:      Rate and Rhythm: Normal rate and regular rhythm.      Pulses: Normal pulses.      Heart sounds: Normal heart sounds. No murmur heard.    No friction rub. No gallop.   Pulmonary:      Effort: Pulmonary effort is normal. No respiratory distress.      Breath sounds: Normal breath sounds. No stridor. No wheezing or rales.   Chest:      Chest wall: No tenderness.   Abdominal:      General: Bowel sounds are normal. There is no distension.      Palpations: Abdomen is soft. There is no mass.      Tenderness: There is no abdominal tenderness. There is no guarding or rebound.      Hernia: No hernia is present.   Musculoskeletal:         General: No swelling, tenderness, deformity or signs of injury. Normal range of motion.      Cervical back: Normal range of motion and neck supple. No rigidity or tenderness.      Right lower leg: No edema.      Left lower leg: No edema.   Lymphadenopathy:      Cervical: No cervical adenopathy.   Skin:     General: Skin is warm and dry.      Coloration: Skin is not jaundiced or pale.      Findings: No bruising, erythema, lesion or rash.   Neurological:      General: No focal deficit present.      Mental Status: She is alert and oriented to person, place, and time.      Motor: No weakness.      Coordination: Coordination normal.   Psychiatric:         Mood and Affect: Mood normal.         Behavior: Behavior normal.         Thought Content: Thought content normal.         Judgment: Judgment normal.         Laboratory        Diagnostic Results:  Ultrasound shows cholelithiasis without ductal dilatation     ASSESSMENT/PLAN:      Symptomatic gallbladder disease.  Natural history of symptomatic gallstones discussed.  Nature and purpose of procedure outlined.  Risks benefits and complications  reviewed.  Consent signed for surgery

## 2023-04-19 NOTE — TRANSFER OF CARE
Anesthesia Transfer of Care Note    Patient: Jennifer Stark    Procedure(s) Performed: Procedure(s) (LRB):  CHOLECYSTECTOMY, LAPAROSCOPIC (N/A)    Patient location: PACU    Anesthesia Type: general    Transport from OR: Transported from OR on 2-3 L/min O2 by NC with adequate spontaneous ventilation    Post pain: adequate analgesia    Post assessment: no apparent anesthetic complications    Post vital signs: stable    Level of consciousness: awake    Nausea/Vomiting: no nausea/vomiting    Complications: none    Transfer of care protocol was followed      Last vitals:   Visit Vitals  /77 (BP Location: Right arm, Patient Position: Lying)   Pulse 86   Temp 36.3 °C (97.3 °F) (Oral)   Resp 16   LMP 03/25/2023   SpO2 100%   Breastfeeding No

## 2023-04-19 NOTE — PLAN OF CARE
Jennifer Meenu Humphrey Lincoln has met all discharge criteria from Phase II. Vital Signs are stable, ambulating  without difficulty. Discharge instructions given, patient verbalized understanding. Discharged from facility via wheelchair in stable condition.

## 2023-04-20 VITALS
TEMPERATURE: 98 F | DIASTOLIC BLOOD PRESSURE: 68 MMHG | OXYGEN SATURATION: 100 % | SYSTOLIC BLOOD PRESSURE: 110 MMHG | RESPIRATION RATE: 18 BRPM | HEART RATE: 77 BPM

## 2023-04-27 LAB
FINAL PATHOLOGIC DIAGNOSIS: NORMAL
GROSS: NORMAL
Lab: NORMAL

## 2023-06-05 DIAGNOSIS — Z30.9 ENCOUNTER FOR CONTRACEPTIVE MANAGEMENT, UNSPECIFIED TYPE: ICD-10-CM

## 2023-06-06 RX ORDER — DROSPIRENONE 4 MG/1
4 TABLET, FILM COATED ORAL DAILY
Qty: 28 TABLET | Refills: 0 | Status: SHIPPED | OUTPATIENT
Start: 2023-06-06 | End: 2023-12-28 | Stop reason: SDUPTHER

## 2023-07-19 ENCOUNTER — PATIENT MESSAGE (OUTPATIENT)
Dept: RESEARCH | Facility: HOSPITAL | Age: 33
End: 2023-07-19
Payer: COMMERCIAL

## 2023-10-04 DIAGNOSIS — Z30.9 ENCOUNTER FOR CONTRACEPTIVE MANAGEMENT, UNSPECIFIED TYPE: ICD-10-CM

## 2023-10-04 RX ORDER — DROSPIRENONE 4 MG/1
4 TABLET, FILM COATED ORAL DAILY
Qty: 28 TABLET | Refills: 0 | Status: CANCELLED | OUTPATIENT
Start: 2023-10-04

## 2023-10-23 ENCOUNTER — PATIENT MESSAGE (OUTPATIENT)
Dept: OBSTETRICS AND GYNECOLOGY | Facility: CLINIC | Age: 33
End: 2023-10-23
Payer: COMMERCIAL

## 2023-11-05 ENCOUNTER — OFFICE VISIT (OUTPATIENT)
Dept: URGENT CARE | Facility: CLINIC | Age: 33
End: 2023-11-05
Payer: COMMERCIAL

## 2023-11-05 VITALS
HEART RATE: 87 BPM | TEMPERATURE: 99 F | WEIGHT: 185 LBS | RESPIRATION RATE: 18 BRPM | BODY MASS INDEX: 34.04 KG/M2 | DIASTOLIC BLOOD PRESSURE: 78 MMHG | OXYGEN SATURATION: 98 % | SYSTOLIC BLOOD PRESSURE: 119 MMHG | HEIGHT: 62 IN

## 2023-11-05 DIAGNOSIS — J10.1 INFLUENZA B: Primary | ICD-10-CM

## 2023-11-05 DIAGNOSIS — R50.9 FEVER, UNSPECIFIED FEVER CAUSE: ICD-10-CM

## 2023-11-05 LAB
CTP QC/QA: YES
CTP QC/QA: YES
POC MOLECULAR INFLUENZA A AGN: NEGATIVE
POC MOLECULAR INFLUENZA B AGN: POSITIVE
SARS-COV-2 AG RESP QL IA.RAPID: NEGATIVE

## 2023-11-05 PROCEDURE — 87502 INFLUENZA DNA AMP PROBE: CPT | Mod: QW,S$GLB,, | Performed by: NURSE PRACTITIONER

## 2023-11-05 PROCEDURE — 87811 SARS CORONAVIRUS 2 ANTIGEN POCT, MANUAL READ: ICD-10-PCS | Mod: QW,S$GLB,, | Performed by: NURSE PRACTITIONER

## 2023-11-05 PROCEDURE — 99214 PR OFFICE/OUTPT VISIT, EST, LEVL IV, 30-39 MIN: ICD-10-PCS | Mod: S$GLB,,, | Performed by: NURSE PRACTITIONER

## 2023-11-05 PROCEDURE — 87811 SARS-COV-2 COVID19 W/OPTIC: CPT | Mod: QW,S$GLB,, | Performed by: NURSE PRACTITIONER

## 2023-11-05 PROCEDURE — 99214 OFFICE O/P EST MOD 30 MIN: CPT | Mod: S$GLB,,, | Performed by: NURSE PRACTITIONER

## 2023-11-05 PROCEDURE — 87502 POCT INFLUENZA A/B MOLECULAR: ICD-10-PCS | Mod: QW,S$GLB,, | Performed by: NURSE PRACTITIONER

## 2023-11-05 RX ORDER — PROMETHAZINE HYDROCHLORIDE AND DEXTROMETHORPHAN HYDROBROMIDE 6.25; 15 MG/5ML; MG/5ML
5 SYRUP ORAL EVERY 4 HOURS PRN
Qty: 180 ML | Refills: 0 | Status: SHIPPED | OUTPATIENT
Start: 2023-11-05 | End: 2023-11-15

## 2023-11-05 RX ORDER — OSELTAMIVIR PHOSPHATE 75 MG/1
75 CAPSULE ORAL 2 TIMES DAILY
Qty: 10 CAPSULE | Refills: 0 | Status: SHIPPED | OUTPATIENT
Start: 2023-11-05 | End: 2023-11-10

## 2023-11-05 NOTE — PATIENT INSTRUCTIONS
"Fever, unspecified fever cause  -     POCT Influenza A/B MOLECULAR  -     SARS Coronavirus 2 Antigen, POCT Manual Read    Influenza B    -     oseltamivir (TAMIFLU) 75 MG capsule; Take 1 capsule (75 mg total) by mouth 2 (two) times daily. for 5 days  Dispense: 10 capsule; Refill: 0        Your Rapid FLU test was POSITIVE FOR INFLUENZA B    -  Take full course of Tamilfu as prescribed.  If symptoms began over 48 hours ago, you are not eligible for Tamiflu.  Symptomatic management is recommended as treatment.    - Rest at home.     - Drink plenty of fluids so you won't get dehydrated.    - Do not share any utensils or share drinks     - Wash hands frequently    - you can take plain Mucinex (guaifenesin) 1200 mg twice a day to help loosen mucous.     Avoid taking Decongestants such as Sudafed, pseudoephedrine, phenylephrine or meds that say "cold," "sinus" or "-D".         - Fever/Pain recommendations:  Alternate Tylenol or Ibuprofen as directed for fever/pain.     Take ibuprofen/Motrin/Advil every 6-8 hours for pain and inflammation.  Do not take ibuprofen if you have a history of GI bleeding, kidney disease, or if you take blood thinners.    You can also take acetaminophen/Tylenol every 6-8 hours for added pain relief. Avoid tylenol if you have a history of liver disease.        - Cough recommendations:  Warm tea with honey can help with cough. Honey is a natural cough suppressant.    NIGHTTIME:  -     promethazine-dextromethorphan (PROMETHAZINE-DM) 6.25-15 mg/5 mL Syrp; Take 5 mLs by mouth every 4 (four) hours as needed (cough).  Dispense: 180 mL; Refill: 0      OR    DAYTIME:   Dextromethorphan (DM) is a cough suppressant over the counter (ie. mucinex DM, delsym).       -Sore throat recommendations: Warm fluids, warm salt water gargles, throat lozenges, tea, honey, soup, or drinking something cold or frozen.  Throat lozenges or sprays help reduce pain. Gargling with warm saltwater (1/4 teaspoon of salt in 1/2 cup " of warm water) or an OTC anesthetic gargle may be useful for irritation.      When to seek medical advice  Call your healthcare provider right away if any of these occur:  Fever that is poorly controlled with OTC fever reducing medication  New or worsening ear pain, sinus pain, or headache  Stiff neck  You can't swallow liquids or you can't open your mouth wide because of throat pain  Signs of dehydration. These include very dark urine or no urine, sunken eyes, and dizziness.  Trouble breathing or noisy breathing  Muffled voice  Rash

## 2023-11-05 NOTE — PROGRESS NOTES
"Subjective:      Patient ID: Jennifer Stark is a 33 y.o. female.    Vitals:  height is 5' 2" (1.575 m) and weight is 83.9 kg (185 lb). Her oral temperature is 99.2 °F (37.3 °C). Her blood pressure is 119/78 and her pulse is 87. Her respiration is 18 and oxygen saturation is 98%.     Chief Complaint: Sore Throat      Pt is a 32 yo female presenting with fever, myalgia, congestion, sore throat, and cough.  Onset of symptoms was 2-3 days ago.  Pt reports using OTC cold/flu, ibuprofen/tylenol, and sudafed with no relief.    Patient reports pain 3/10. 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     Sore Throat   This is a new problem. The problem has been unchanged. Neither side of throat is experiencing more pain than the other. The maximum temperature recorded prior to her arrival was 100.4 - 100.9 F. The fever has been present for Less than 1 day. The pain is at a severity of 3/10. The pain is moderate. Associated symptoms include congestion and coughing. Pertinent negatives include no diarrhea, ear pain, headaches, shortness of breath or vomiting. She has tried NSAIDs and acetaminophen for the symptoms. The treatment provided mild relief.       Constitution: Positive for fatigue and fever. Negative for chills.   HENT:  Positive for congestion and sore throat. Negative for ear pain and sinus pain.    Cardiovascular:  Negative for chest pain.   Respiratory:  Positive for cough. Negative for sputum production and shortness of breath.    Gastrointestinal:  Negative for nausea, vomiting and diarrhea.   Musculoskeletal:  Positive for muscle ache.   Neurological:  Negative for headaches.      Objective:     Physical Exam   Constitutional: She is oriented to person, place, and time.   HENT:   Head: Normocephalic.   Ears:   Right Ear: Hearing and external ear normal. No no drainage, swelling or tenderness. Tympanic membrane is not erythematous, not retracted and not bulging. No middle ear effusion.   Left Ear: " Hearing and external ear normal. No no drainage, swelling or tenderness. Tympanic membrane is not erythematous, not retracted and not bulging.  No middle ear effusion.   Nose: Nose normal. No mucosal edema, rhinorrhea or purulent discharge. Right sinus exhibits no maxillary sinus tenderness and no frontal sinus tenderness. Left sinus exhibits no maxillary sinus tenderness and no frontal sinus tenderness.   Mouth/Throat: Uvula is midline, oropharynx is clear and moist and mucous membranes are normal. No trismus in the jaw. No uvula swelling. No oropharyngeal exudate, posterior oropharyngeal edema or posterior oropharyngeal erythema.   Cardiovascular: Normal rate and regular rhythm.   Pulmonary/Chest: Effort normal and breath sounds normal.   Neurological: She is alert and oriented to person, place, and time.   Skin: Skin is warm and dry.   Nursing note and vitals reviewed.      Assessment:     1. Influenza B    2. Fever, unspecified fever cause        Plan:       Influenza B  -     promethazine-dextromethorphan (PROMETHAZINE-DM) 6.25-15 mg/5 mL Syrp; Take 5 mLs by mouth every 4 (four) hours as needed (cough).  Dispense: 180 mL; Refill: 0  -     oseltamivir (TAMIFLU) 75 MG capsule; Take 1 capsule (75 mg total) by mouth 2 (two) times daily. for 5 days  Dispense: 10 capsule; Refill: 0    Fever, unspecified fever cause  -     POCT Influenza A/B MOLECULAR  -     SARS Coronavirus 2 Antigen, POCT Manual Read      Patient Instructions   Fever, unspecified fever cause  -     POCT Influenza A/B MOLECULAR  -     SARS Coronavirus 2 Antigen, POCT Manual Read    Influenza B    -     oseltamivir (TAMIFLU) 75 MG capsule; Take 1 capsule (75 mg total) by mouth 2 (two) times daily. for 5 days  Dispense: 10 capsule; Refill: 0        Your Rapid FLU test was POSITIVE FOR INFLUENZA B    -  Take full course of Tamilfu as prescribed.  If symptoms began over 48 hours ago, you are not eligible for Tamiflu.  Symptomatic management is  "recommended as treatment.    - Rest at home.     - Drink plenty of fluids so you won't get dehydrated.    - Do not share any utensils or share drinks     - Wash hands frequently    - you can take plain Mucinex (guaifenesin) 1200 mg twice a day to help loosen mucous.     Avoid taking Decongestants such as Sudafed, pseudoephedrine, phenylephrine or meds that say "cold," "sinus" or "-D".         - Fever/Pain recommendations:  Alternate Tylenol or Ibuprofen as directed for fever/pain.     Take ibuprofen/Motrin/Advil every 6-8 hours for pain and inflammation.  Do not take ibuprofen if you have a history of GI bleeding, kidney disease, or if you take blood thinners.    You can also take acetaminophen/Tylenol every 6-8 hours for added pain relief. Avoid tylenol if you have a history of liver disease.        - Cough recommendations:  Warm tea with honey can help with cough. Honey is a natural cough suppressant.    NIGHTTIME:  -     promethazine-dextromethorphan (PROMETHAZINE-DM) 6.25-15 mg/5 mL Syrp; Take 5 mLs by mouth every 4 (four) hours as needed (cough).  Dispense: 180 mL; Refill: 0      OR    DAYTIME:   Dextromethorphan (DM) is a cough suppressant over the counter (ie. mucinex DM, delsym).       -Sore throat recommendations: Warm fluids, warm salt water gargles, throat lozenges, tea, honey, soup, or drinking something cold or frozen.  Throat lozenges or sprays help reduce pain. Gargling with warm saltwater (1/4 teaspoon of salt in 1/2 cup of warm water) or an OTC anesthetic gargle may be useful for irritation.      When to seek medical advice  Call your healthcare provider right away if any of these occur:  Fever that is poorly controlled with OTC fever reducing medication  New or worsening ear pain, sinus pain, or headache  Stiff neck  You can't swallow liquids or you can't open your mouth wide because of throat pain  Signs of dehydration. These include very dark urine or no urine, sunken eyes, and " dizziness.  Trouble breathing or noisy breathing  Muffled voice  Rash

## 2023-11-21 ENCOUNTER — NURSE TRIAGE (OUTPATIENT)
Dept: ADMINISTRATIVE | Facility: CLINIC | Age: 33
End: 2023-11-21
Payer: COMMERCIAL

## 2023-11-22 NOTE — TELEPHONE ENCOUNTER
Pt reports having body aches and headache today, did recently have mice in her house, read that they can carry diseases and that she should not have been vacuuming up the mice dropping as it can cause airborne illnesses. Pt advised home care per protocol, but encouraged to call back with any worsening symptoms or questions. Also to reach out to her PCP office when open to discuss if any follow up is needed since exposed to mice. Pt verbalized understanding.    Reason for Disposition   [1] MILD pain (e.g., does not interfere with normal activities) AND [2] present < 7 days    Additional Information   Negative: Shock suspected (e.g., cold/pale/clammy skin, too weak to stand, low BP, rapid pulse)   Negative: Difficult to awaken or acting confused (e.g., disoriented, slurred speech)   Negative: Sounds like a life-threatening emergency to the triager   Negative: Dark (cola or tea-colored) or red-colored urine   Negative: [1] Drinking very little AND [2] dehydration suspected (e.g., no urine > 12 hours, very dry mouth, very lightheaded)   Negative: Patient sounds very sick or weak to the triager   Negative: [1] SEVERE pain (e.g., excruciating, unable to do any normal activities) AND [2] not improved 2 hours after pain medicine   Negative: [1] SEVERE pain AND [2] taking a statin medicine (a lipid or cholesterol lowering drug)   Negative: Fever > 104 F (40 C)   Negative: [1] Fever > 101 F (38.3 C) AND [2] age > 60 years   Negative: [1] Fever > 100.0 F (37.8 C) AND [2] bedridden (e.g., CVA, chronic illness, recovering from surgery)   Negative: [1] Fever > 100.0 F (37.8 C) AND [2] indwelling urinary catheter (e.g., [place], [place]de)   Negative: [1] Fever > 100.0 F (37.8 C) AND [2] diabetes mellitus or weak immune system (e.g., HIV positive, cancer chemo, splenectomy, organ transplant, chronic steroids)   Negative: Fever present > 3 days (72 hours)   Negative: [1] Muscle aches are unexplained AND [2] occur within 1 month of a tick  bite   Negative: Diabetes mellitus or weak immune system (e.g., HIV positive, cancer chemo, splenectomy, organ transplant, chronic steroids)   Negative: [1] MODERATE pain (e.g., interferes with normal activities) AND [2] present > 3 days   Negative: [1] MILD or MODERATE muscle aches or pain AND [2] taking a statin medicine (a lipid or cholesterol lowering drug)   Negative: [1] Age > 50 AND [2] bilateral shoulder pains present > 2 weeks   Negative: [1] MILD pain (e.g., does not interfere with normal activities) AND [2] present > 7 days   Negative: Muscle aches are a chronic symptom (recurrent or ongoing AND present > 4 weeks)   Negative: Body pains are a chronic symptom (recurrent or ongoing AND present > 4 weeks)    Protocols used: Muscle Aches and Body Pain-A-AH

## 2023-12-28 ENCOUNTER — OFFICE VISIT (OUTPATIENT)
Dept: OBSTETRICS AND GYNECOLOGY | Facility: CLINIC | Age: 33
End: 2023-12-28
Payer: COMMERCIAL

## 2023-12-28 VITALS
DIASTOLIC BLOOD PRESSURE: 68 MMHG | WEIGHT: 196.19 LBS | HEIGHT: 62 IN | SYSTOLIC BLOOD PRESSURE: 118 MMHG | BODY MASS INDEX: 36.1 KG/M2

## 2023-12-28 DIAGNOSIS — Z30.9 ENCOUNTER FOR CONTRACEPTIVE MANAGEMENT, UNSPECIFIED TYPE: ICD-10-CM

## 2023-12-28 DIAGNOSIS — Z12.4 PAP SMEAR FOR CERVICAL CANCER SCREENING: ICD-10-CM

## 2023-12-28 DIAGNOSIS — Z01.419 ENCOUNTER FOR WELL WOMAN EXAM: Primary | ICD-10-CM

## 2023-12-28 PROCEDURE — 3008F PR BODY MASS INDEX (BMI) DOCUMENTED: ICD-10-PCS | Mod: CPTII,S$GLB,, | Performed by: FAMILY MEDICINE

## 2023-12-28 PROCEDURE — 99999 PR PBB SHADOW E&M-EST. PATIENT-LVL III: CPT | Mod: PBBFAC,,, | Performed by: FAMILY MEDICINE

## 2023-12-28 PROCEDURE — 88175 CYTOPATH C/V AUTO FLUID REDO: CPT | Performed by: FAMILY MEDICINE

## 2023-12-28 PROCEDURE — 3074F PR MOST RECENT SYSTOLIC BLOOD PRESSURE < 130 MM HG: ICD-10-PCS | Mod: CPTII,S$GLB,, | Performed by: FAMILY MEDICINE

## 2023-12-28 PROCEDURE — 3078F PR MOST RECENT DIASTOLIC BLOOD PRESSURE < 80 MM HG: ICD-10-PCS | Mod: CPTII,S$GLB,, | Performed by: FAMILY MEDICINE

## 2023-12-28 PROCEDURE — 1159F MED LIST DOCD IN RCRD: CPT | Mod: CPTII,S$GLB,, | Performed by: FAMILY MEDICINE

## 2023-12-28 PROCEDURE — 99395 PR PREVENTIVE VISIT,EST,18-39: ICD-10-PCS | Mod: S$GLB,,, | Performed by: FAMILY MEDICINE

## 2023-12-28 PROCEDURE — 3008F BODY MASS INDEX DOCD: CPT | Mod: CPTII,S$GLB,, | Performed by: FAMILY MEDICINE

## 2023-12-28 PROCEDURE — 99999 PR PBB SHADOW E&M-EST. PATIENT-LVL III: ICD-10-PCS | Mod: PBBFAC,,, | Performed by: FAMILY MEDICINE

## 2023-12-28 PROCEDURE — 1160F PR REVIEW ALL MEDS BY PRESCRIBER/CLIN PHARMACIST DOCUMENTED: ICD-10-PCS | Mod: CPTII,S$GLB,, | Performed by: FAMILY MEDICINE

## 2023-12-28 PROCEDURE — 3078F DIAST BP <80 MM HG: CPT | Mod: CPTII,S$GLB,, | Performed by: FAMILY MEDICINE

## 2023-12-28 PROCEDURE — 87624 HPV HI-RISK TYP POOLED RSLT: CPT | Performed by: FAMILY MEDICINE

## 2023-12-28 PROCEDURE — 3074F SYST BP LT 130 MM HG: CPT | Mod: CPTII,S$GLB,, | Performed by: FAMILY MEDICINE

## 2023-12-28 PROCEDURE — 1160F RVW MEDS BY RX/DR IN RCRD: CPT | Mod: CPTII,S$GLB,, | Performed by: FAMILY MEDICINE

## 2023-12-28 PROCEDURE — 99395 PREV VISIT EST AGE 18-39: CPT | Mod: S$GLB,,, | Performed by: FAMILY MEDICINE

## 2023-12-28 PROCEDURE — 1159F PR MEDICATION LIST DOCUMENTED IN MEDICAL RECORD: ICD-10-PCS | Mod: CPTII,S$GLB,, | Performed by: FAMILY MEDICINE

## 2023-12-28 RX ORDER — DROSPIRENONE 4 MG/1
4 TABLET, FILM COATED ORAL DAILY
Qty: 84 TABLET | Refills: 3 | Status: SHIPPED | OUTPATIENT
Start: 2023-12-28

## 2023-12-28 RX ORDER — IBUPROFEN 200 MG
200 TABLET ORAL EVERY 6 HOURS PRN
COMMUNITY

## 2023-12-28 NOTE — PROGRESS NOTES
HISTORY OF PRESENT ILLNESS:    Jennifer Stark is a 33 y.o. female, , Patient's last menstrual period was 2023 (approximate).,  presents for a routine annual exam .   Last pap:   NL   Gardisil?: No  Last mammogram: na  Last colon ca screening:  na   SA: female partner  Contraception: oral progesterone-only contraceptive.    Sexually transmitted infection risk: very low risk of STD exposure.   Menstrual flow: regular every 28-30 days on pill but irregular off - hx of PCOS. Sometimes BTB when on pill, not bothersome  -no breast pain, uti sx, abnormal vd.  This is the extent of the patient's complaints at this time.     Past Medical History:   Diagnosis Date    Abnormal cervical Papanicolaou smear     repeat paps    Borderline diabetes     no meds    Migraines     with aura    PCOS (polycystic ovarian syndrome)     Metformin late , stopped 2018    Refusal of blood transfusions as patient is Mandaeism        Past Surgical History:   Procedure Laterality Date    KNEE SURGERY      LAPAROSCOPIC CHOLECYSTECTOMY N/A 2023    Procedure: CHOLECYSTECTOMY, LAPAROSCOPIC;  Surgeon: Elijah Bettencourt Jr., MD;  [facility];  Service: General;  Laterality: N/A;    VAGINAL DELIVERY      x 1 no epidural    WISDOM TOOTH EXTRACTION         MEDICATIONS AND ALLERGIES:      Current Outpatient Medications:     hydrOXYzine (ATARAX) 50 MG tablet, Take 50 mg by mouth daily as needed., Disp: , Rfl:     ibuprofen (ADVIL,MOTRIN) 200 MG tablet, Take 200 mg by mouth every 6 (six) hours as needed., Disp: , Rfl:     ondansetron (ZOFRAN-ODT) 4 MG TbDL, Take 1 tablet (4 mg total) by mouth every 8 (eight) hours as needed (Nausea)., Disp: 15 tablet, Rfl: 0    sumatriptan (IMITREX) 50 MG tablet, Take 1 tablet (50 mg total) by mouth every 2 (two) hours as needed for Migraine (Take 2 doses maximum per day)., Disp: 30 tablet, Rfl: 0    drospirenone, contraceptive, (SLYND) 4 mg (28) Tab, Take 1 tablet  "(4 mg total) by mouth once daily., Disp: 90 tablet, Rfl: 3    Review of patient's allergies indicates:   Allergen Reactions    Pcn [penicillins] Other (See Comments)     Uncertain of reaction - just told from her mother that since she was little she is allergic       Family History   Problem Relation Age of Onset    No Known Problems Father     Thyroid disease Mother     No Known Problems Sister     No Known Problems Brother     No Known Problems Maternal Aunt     No Known Problems Maternal Uncle     No Known Problems Paternal Aunt     No Known Problems Paternal Uncle     Cataracts Maternal Grandmother     Hypertension Maternal Grandmother     Stroke Maternal Grandfather     No Known Problems Paternal Grandmother     Lung cancer Paternal Grandfather         Environmental    Breast cancer Neg Hx     Colon cancer Neg Hx     Ovarian cancer Neg Hx     Amblyopia Neg Hx     Blindness Neg Hx     Cancer Neg Hx     Diabetes Neg Hx     Glaucoma Neg Hx     Macular degeneration Neg Hx     Retinal detachment Neg Hx     Strabismus Neg Hx        Social History     Socioeconomic History    Marital status:      Spouse name: J   Occupational History    Occupation: Teacher   Tobacco Use    Smoking status: Never    Smokeless tobacco: Never   Substance and Sexual Activity    Alcohol use: Yes     Comment: none 24 hr prior to surgery    Drug use: No    Sexual activity: Yes     Partners: Female     Birth control/protection: None     Comment: Partner J   Social History Narrative    Partner "ZAYNAB" / Grace (has other son laz Mcknight)    First baby together        No cats     Social Determinants of Health     Financial Resource Strain: Low Risk  (11/12/2020)    Overall Financial Resource Strain (CARDIA)     Difficulty of Paying Living Expenses: Not hard at all   Food Insecurity: No Food Insecurity (11/12/2020)    Hunger Vital Sign     Worried About Running Out of Food in the Last Year: Never true     Ran Out of Food in the Last " Year: Never true   Transportation Needs: No Transportation Needs (2020)    PRAPARE - Transportation     Lack of Transportation (Medical): No     Lack of Transportation (Non-Medical): No   Physical Activity: Inactive (2020)    Exercise Vital Sign     Days of Exercise per Week: 0 days     Minutes of Exercise per Session: 0 min   Stress: Stress Concern Present (2020)    [employer] - Occupational Stress Questionnaire     Feeling of Stress : To some extent   Social Connections: Socially Integrated (2020)    Social Connection and Isolation Panel [NHANES]     Frequency of Communication with Friends and Family: Three times a week     Frequency of Social Gatherings with Friends and Family: Once a week     Attends Anabaptism Services: 1 to 4 times per year     Active Member of Clubs or Organizations: Yes     Attends Club or Organization Meetings: Never     Marital Status:        OB History    Para Term  AB Living   1 1 1 0 0 1   SAB IAB Ectopic Multiple Live Births   0 0 0 0 1      # Outcome Date GA Lbr Kenneth/2nd Weight Sex Delivery Anes PTL Lv   1 Term 19 38w4d  3.147 kg (6 lb 15 oz) F Vag-Spont None N CARMELA          COMPREHENSIVE GYN HISTORY:  PAP History: + abnormal Paps ( unsure what was abn).  Infection History: Denies STDs. Denies PID.  Benign History: Denies uterine fibroids. Denies ovarian cysts. Denies endometriosis. Denies other conditions.  Cancer History: Denies cervical cancer. Denies uterine cancer or hyperplasia. Denies ovarian cancer. Denies vulvar cancer or pre-cancer. Denies vaginal cancer or pre-cancer. Denies breast cancer. Denies colon cancer.      ROS:  GENERAL: No weight changes. No swelling. No fatigue. No fever.  BREASTS: No pain. No lumps. No discharge.  ABDOMEN: No pain. No nausea. No vomiting. No diarrhea. No constipation.  REPRODUCTIVE: + abnormal bleeding. No pelvic pain.   VULVA: No pain. No lesions. No  "itching.  VAGINA: No relaxation. No itching. No odor. No discharge. No lesions.  URINARY: No incontinence. No nocturia. No frequency. No dysuria.    /68   Ht 5' 2" (1.575 m)   Wt 89 kg (196 lb 3.4 oz)   LMP 12/02/2023 (Approximate)   BMI 35.89 kg/m²     PE:  Physical Exam:   Constitutional: She is oriented to person, place, and time. She appears well-developed and well-nourished. No distress.      Neck: No thyroid mass and no thyromegaly present.     Pulmonary/Chest: Right breast exhibits no inverted nipple, no mass, no nipple discharge, no skin change, no tenderness and no bleeding. Left breast exhibits no inverted nipple, no mass, no nipple discharge, no skin change, no tenderness and no bleeding.        Abdominal: Soft. There is no abdominal tenderness.     Genitourinary:    Vagina, uterus, right adnexa and left adnexa normal.      Pelvic exam was performed with patient in the lithotomy position.   The external female genitalia was normal.   Genitalia hair distrobution normal .   There is no rash or lesion on the right labia. There is no rash or lesion on the left labia. Cervix is normal. Right adnexum displays no mass, no tenderness and no fullness. Left adnexum displays no mass, no tenderness and no fullness. No vaginal discharge, tenderness, bleeding, rectocele, cystocele or prolapse of vaginal walls in the vagina.    No foreign body in the vagina.   Cervix exhibits no motion tenderness, no lesion, no discharge, no friability, no tenderness and no polyp.    pap smear completedUterus is not enlarged and not tender. Normal urethral meatus.              Neurological: She is alert and oriented to person, place, and time.          PROCEDURES/ORDERS:  Pap      Assessment/Plan:    Encounter for well woman exam    Pap smear for cervical cancer screening  -     HPV High Risk Genotypes, PCR  -     Liquid-Based Pap Smear, Screening    Encounter for contraceptive management, unspecified type  -     drospirenone, " contraceptive, (SLYND) 4 mg (28) Tab; Take 1 tablet (4 mg total) by mouth once daily.  Dispense: 90 tablet; Refill: 3    -if slynd not approved can try micronor, if still not approved she may try nexplanon    COUNSELING:  The patient was counseled today on:  -A.C.S. Pap and pelvic exam guidelines, recomendations for yearly mammogram, monthly self breast exams and to follow up with her PCP for other health maintenance.    FOLLOW-UP  annually for WWE.

## 2024-01-05 LAB
HPV HR 12 DNA SPEC QL NAA+PROBE: NEGATIVE
HPV16 AG SPEC QL: NEGATIVE
HPV18 DNA SPEC QL NAA+PROBE: NEGATIVE

## 2024-01-10 LAB
FINAL PATHOLOGIC DIAGNOSIS: NORMAL
Lab: NORMAL

## 2024-09-14 ENCOUNTER — CLINICAL SUPPORT (OUTPATIENT)
Dept: OTHER | Facility: CLINIC | Age: 34
End: 2024-09-14

## 2024-09-14 DIAGNOSIS — Z00.8 ENCOUNTER FOR OTHER GENERAL EXAMINATION: ICD-10-CM

## 2024-09-19 VITALS
SYSTOLIC BLOOD PRESSURE: 127 MMHG | SYSTOLIC BLOOD PRESSURE: 127 MMHG | DIASTOLIC BLOOD PRESSURE: 83 MMHG | DIASTOLIC BLOOD PRESSURE: 83 MMHG

## 2024-09-19 LAB
GLUCOSE SERPL-MCNC: 112 MG/DL (ref 60–140)
GLUCOSE SERPL-MCNC: 112 MG/DL (ref 60–140)
HDLC SERPL-MCNC: 38 MG/DL
HDLC SERPL-MCNC: 38 MG/DL
POC CHOLESTEROL, LDL (DOCK): 122 MG/DL
POC CHOLESTEROL, LDL (DOCK): 122 MG/DL
POC CHOLESTEROL, TOTAL: 180 MG/DL
POC CHOLESTEROL, TOTAL: 180 MG/DL
TRIGL SERPL-MCNC: 111 MG/DL
TRIGL SERPL-MCNC: 111 MG/DL

## 2024-11-19 ENCOUNTER — OFFICE VISIT (OUTPATIENT)
Dept: INTERNAL MEDICINE | Facility: CLINIC | Age: 34
End: 2024-11-19
Payer: COMMERCIAL

## 2024-11-19 VITALS
HEIGHT: 62 IN | SYSTOLIC BLOOD PRESSURE: 127 MMHG | WEIGHT: 196.19 LBS | DIASTOLIC BLOOD PRESSURE: 83 MMHG | BODY MASS INDEX: 36.1 KG/M2

## 2024-11-19 DIAGNOSIS — F32.A DEPRESSION, UNSPECIFIED DEPRESSION TYPE: ICD-10-CM

## 2024-11-19 DIAGNOSIS — R11.0 NAUSEA: Primary | ICD-10-CM

## 2024-11-19 PROCEDURE — 99213 OFFICE O/P EST LOW 20 MIN: CPT | Mod: 95,,,

## 2024-11-19 PROCEDURE — 3079F DIAST BP 80-89 MM HG: CPT | Mod: CPTII,95,,

## 2024-11-19 PROCEDURE — 1160F RVW MEDS BY RX/DR IN RCRD: CPT | Mod: CPTII,95,,

## 2024-11-19 PROCEDURE — 1159F MED LIST DOCD IN RCRD: CPT | Mod: CPTII,95,,

## 2024-11-19 PROCEDURE — 3008F BODY MASS INDEX DOCD: CPT | Mod: CPTII,95,,

## 2024-11-19 PROCEDURE — 3074F SYST BP LT 130 MM HG: CPT | Mod: CPTII,95,,

## 2024-11-19 RX ORDER — ONDANSETRON 4 MG/1
4 TABLET, ORALLY DISINTEGRATING ORAL EVERY 8 HOURS PRN
Qty: 15 TABLET | Refills: 0 | Status: SHIPPED | OUTPATIENT
Start: 2024-11-19

## 2024-11-19 NOTE — PROGRESS NOTES
Telehealth Visit  [facility]      The patient location is: Home in [place]   The chief complaint leading to consultation is: Anxiety and Depression  Visit type: audiovisual  Face to Face time with patient: 30 minutes      HPI: Jennifer Stark is a 34 y.o. year old female who presents for virtual visit of depression/anxiety    She reports a history of depression since her teenage years, with symptoms worsening at the end of last year. She recently participated in a depression study at [facility], responding well to the study drug Auvelity. The study involved keeping a diary, attending weekly check-ups, and undergoing occasional EKGs. She endorsed great benefit with auvelity while in the study. She is no longer enrolled in the study. She previously tried an antidepressant a few years ago, which caused significant lethargy. She has an as-needed anxiety medication, possibly hydroxyzine, that causes drowsiness. Patient interested in psychiatry referral to see if she is a good candidate for long term auvelity use.     Physical Exam:   Gen- NAD, appears stated age  Resp- Breathing comfortably on RA  Neuro- Alert, spontaneous movements  Psych- Calm, cooperative, logical thought process     Assessment and plan:      Jennifer was seen today for medication refill.    Diagnoses and all orders for this visit:    Nausea  -     ondansetron (ZOFRAN-ODT) 4 MG TbDL; Take 1 tablet (4 mg total) by mouth every 8 (eight) hours as needed (Nausea).    Depression, unspecified depression type  -     Ambulatory referral/consult to Psychiatry; Future    Discussed patient's history of depression and recent participation in depression study using Auvelity (bupropion combination) Unable to prescribe Auvelty due to its novelty. Considered bupropion as potential alternative, given its presence in formulation Noted patient's previous negative experiences with other antidepressants,  including lethargy and sedation. Patient to make establish care visit to further discuss anti-depressants.       Follow-up: PRN for establishing care visit    30 minutes of total time spent on the encounter, which includes face to face time and non-face to face time preparing to see the patient (eg, review of tests), Obtaining and/or reviewing separately obtained history, Documenting clinical information in the electronic or other health record, Independently interpreting results (not separately reported) and communicating results to the patient/family/caregiver, or Care coordination (not separately reported).     This service was not originating from a related E/M service provided within the previous 7 days nor will  to an E/M service or procedure within the next 24 hours or my soonest available appointment.  Prevailing standard of care was able to be met in this audio-only visit.      Elijah Kramer MD  [facility]

## 2025-01-02 ENCOUNTER — TELEPHONE (OUTPATIENT)
Dept: OBSTETRICS AND GYNECOLOGY | Facility: CLINIC | Age: 35
End: 2025-01-02
Payer: MEDICAID

## 2025-01-02 DIAGNOSIS — Z30.9 ENCOUNTER FOR CONTRACEPTIVE MANAGEMENT, UNSPECIFIED TYPE: ICD-10-CM

## 2025-01-02 RX ORDER — DROSPIRENONE 4 MG/1
4 TABLET, FILM COATED ORAL DAILY
Qty: 84 TABLET | Refills: 3 | OUTPATIENT
Start: 2025-01-02

## 2025-01-06 DIAGNOSIS — Z30.9 ENCOUNTER FOR CONTRACEPTIVE MANAGEMENT, UNSPECIFIED TYPE: ICD-10-CM

## 2025-01-07 RX ORDER — DROSPIRENONE 4 MG/1
4 TABLET, FILM COATED ORAL DAILY
Qty: 84 TABLET | Refills: 3 | OUTPATIENT
Start: 2025-01-07

## 2025-01-09 ENCOUNTER — OFFICE VISIT (OUTPATIENT)
Dept: OBSTETRICS AND GYNECOLOGY | Facility: CLINIC | Age: 35
End: 2025-01-09
Payer: MEDICAID

## 2025-01-09 VITALS
BODY MASS INDEX: 35.27 KG/M2 | SYSTOLIC BLOOD PRESSURE: 120 MMHG | DIASTOLIC BLOOD PRESSURE: 74 MMHG | HEIGHT: 63 IN | WEIGHT: 199.06 LBS

## 2025-01-09 DIAGNOSIS — Z01.419 ENCOUNTER FOR WELL WOMAN EXAM: Primary | ICD-10-CM

## 2025-01-09 DIAGNOSIS — Z30.9 ENCOUNTER FOR CONTRACEPTIVE MANAGEMENT, UNSPECIFIED TYPE: ICD-10-CM

## 2025-01-09 LAB
B-HCG UR QL: NEGATIVE
CTP QC/QA: YES

## 2025-01-09 PROCEDURE — 81025 URINE PREGNANCY TEST: CPT | Mod: PBBFAC | Performed by: FAMILY MEDICINE

## 2025-01-09 PROCEDURE — 99213 OFFICE O/P EST LOW 20 MIN: CPT | Mod: PBBFAC | Performed by: FAMILY MEDICINE

## 2025-01-09 PROCEDURE — 99999 PR PBB SHADOW E&M-EST. PATIENT-LVL III: CPT | Mod: PBBFAC,,, | Performed by: FAMILY MEDICINE

## 2025-01-09 PROCEDURE — 99999PBSHW POCT URINE PREGNANCY: Mod: PBBFAC,,,

## 2025-01-09 RX ORDER — DROSPIRENONE 4 MG/1
4 TABLET, FILM COATED ORAL DAILY
Qty: 84 TABLET | Refills: 3 | Status: SHIPPED | OUTPATIENT
Start: 2025-01-09

## 2025-01-09 NOTE — PROGRESS NOTES
HISTORY OF PRESENT ILLNESS:    Jennifer Stark is a 34 y.o. female, , Patient's last menstrual period was 2024 (exact date).,  presents for a routine annual exam .   Last pap:   NL   Last mammogram: na  Last colon ca screening:  na   SA: male partner, does not use condoms  Contraception: oral progesterone-only contraceptive.    Sexually transmitted infection risk: very low risk of STD exposure.   Menstrual flow: regular every 28-30 days. Rarely break through bleeding  -no breast pain uti sx abnormal vd  This is the extent of the patient's complaints at this time.     Past Medical History:   Diagnosis Date    Abnormal cervical Papanicolaou smear     repeat paps    Borderline diabetes     no meds    Migraines     with aura    PCOS (polycystic ovarian syndrome)     Metformin late , stopped 2018    Refusal of blood transfusions as patient is Mandaeism        Past Surgical History:   Procedure Laterality Date    KNEE SURGERY      LAPAROSCOPIC CHOLECYSTECTOMY N/A 2023    Procedure: CHOLECYSTECTOMY, LAPAROSCOPIC;  Surgeon: Elijah Bettencourt Jr., MD;  Location: [facility];  Service: General;  Laterality: N/A;    VAGINAL DELIVERY      x 1 no epidural    WISDOM TOOTH EXTRACTION         MEDICATIONS AND ALLERGIES:      Current Outpatient Medications:     drospirenone, contraceptive, (SLYND) 4 mg (28) Tab, Take 1 tablet (4 mg total) by mouth once daily., Disp: 84 tablet, Rfl: 3    Review of patient's allergies indicates:   Allergen Reactions    Pcn [penicillins] Other (See Comments)     Uncertain of reaction - just told from her mother that since she was little she is allergic       Family History   Problem Relation Name Age of Onset    No Known Problems Father      Thyroid disease Mother      No Known Problems Sister      No Known Problems Brother      No Known Problems Maternal Aunt      No Known Problems Maternal Uncle      No Known Problems Paternal Aunt      No Known  "Problems Paternal Uncle      Cataracts Maternal Grandmother      Hypertension Maternal Grandmother      Stroke Maternal Grandfather      No Known Problems Paternal Grandmother      Lung cancer Paternal Grandfather          Environmental    Breast cancer Neg Hx      Colon cancer Neg Hx      Ovarian cancer Neg Hx      Amblyopia Neg Hx      Blindness Neg Hx      Cancer Neg Hx      Diabetes Neg Hx      Glaucoma Neg Hx      Macular degeneration Neg Hx      Retinal detachment Neg Hx      Strabismus Neg Hx         Social History     Socioeconomic History    Marital status:      Spouse name: J   Occupational History    Occupation: Teacher   Tobacco Use    Smoking status: Never    Smokeless tobacco: Never   Substance and Sexual Activity    Alcohol use: Yes     Comment: none 24 hr prior to surgery    Drug use: No    Sexual activity: Yes     Partners: Female     Birth control/protection: None     Comment: Partner J   Social History Narrative    Partner "ZAYNAB" / Grace (has other son 7yo Elijah Mcknight)    First baby together        No cats     Social Drivers of Health     Financial Resource Strain: High Risk (11/18/2024)    Overall Financial Resource Strain (CARDIA)     Difficulty of Paying Living Expenses: Hard   Food Insecurity: Food Insecurity Present (11/18/2024)    Hunger Vital Sign     Worried About Running Out of Food in the Last Year: Sometimes true     Ran Out of Food in the Last Year: Sometimes true   Transportation Needs: No Transportation Needs (1/10/2024)    Received from [facility]    PRAPARE - Transportation     Lack of Transportation (Medical): No     Lack of Transportation (Non-Medical): No   Physical Activity: Insufficiently Active (11/18/2024)    Exercise Vital Sign     Days of Exercise per Week: 2 days     Minutes of Exercise per Session: 10 min   Stress: Stress Concern Present (11/18/2024)    [employer] [place] [employer] [employer] - Occupational Stress Questionnaire     Feeling of Stress : Very much " "  Housing Stability: High Risk (2024)    Housing Stability Vital Sign     Unable to Pay for Housing in the Last Year: Yes       OB History    Para Term  AB Living   1 1 1 0 0 1   SAB IAB Ectopic Multiple Live Births   0 0 0 0 1      # Outcome Date GA Lbr Kenneth/2nd Weight Sex Type Anes PTL Lv   1 Term 19 38w4d  3.147 kg (6 lb 15 oz) F Vag-Spont None N CARMELA          COMPREHENSIVE GYN HISTORY:  PAP History: + abnormal Paps.  Infection History: Denies STDs. Denies PID.  Benign History: Denies uterine fibroids. Denies ovarian cysts. Denies endometriosis. Denies other conditions.  Cancer History: Denies cervical cancer. Denies uterine cancer or hyperplasia. Denies ovarian cancer. Denies vulvar cancer or pre-cancer. Denies vaginal cancer or pre-cancer. Denies breast cancer. Denies colon cancer.      ROS:  GENERAL: No weight changes. No swelling. No fatigue. No fever.  BREASTS: No pain. No lumps. No discharge.  ABDOMEN: No pain. No nausea. No vomiting. No diarrhea. No constipation.  REPRODUCTIVE: + abnormal bleeding. No pelvic pain.   VULVA: No pain. No lesions. No itching.  VAGINA: No relaxation. No itching. No odor. No discharge. No lesions.  URINARY: No incontinence. No nocturia. No frequency. No dysuria.    /74 (Patient Position: Sitting)   Ht 5' 3" (1.6 m)   Wt 90.3 kg (199 lb 1.2 oz)   LMP 2024 (Exact Date)   BMI 35.26 kg/m²     PE:  Physical Exam:   Constitutional: She is oriented to person, place, and time. She appears well-developed and well-nourished. No distress.      Neck: No thyroid mass and no thyromegaly present.     Pulmonary/Chest: Right breast exhibits no inverted nipple, no mass, no nipple discharge, no skin change, no tenderness and no bleeding. Left breast exhibits no inverted nipple, no mass, no nipple discharge, no skin change, no tenderness and no bleeding.        Abdominal: Soft. There is no abdominal tenderness.     Genitourinary:    Vagina, uterus, right " adnexa and left adnexa normal.      Pelvic exam was performed with patient in the lithotomy position.   The external female genitalia was normal.   Genitalia hair distrobution normal .   There is no rash or lesion on the right labia. There is no rash or lesion on the left labia. Cervix is normal. Right adnexum displays no mass, no tenderness and no fullness. Left adnexum displays no mass, no tenderness and no fullness. No vaginal discharge, tenderness, bleeding, rectocele, cystocele or prolapse of vaginal walls in the vagina.    No foreign body in the vagina.   Cervix exhibits no motion tenderness, no lesion, no discharge, no friability, no tenderness and no polyp.    pap smear not completedUterus is not enlarged and not tender. Normal urethral meatus.              Neurological: She is alert and oriented to person, place, and time.          PROCEDURES/ORDERS:  Pap    Results for orders placed or performed in visit on 01/09/25   POCT Urine Pregnancy    Collection Time: 01/09/25  2:29 PM   Result Value Ref Range    POC Preg Test, Ur Negative Negative     Acceptable Yes          Assessment/Plan:    Encounter for well woman exam    Encounter for contraceptive management, unspecified type  -     POCT Urine Pregnancy  -     drospirenone, contraceptive, (SLYND) 4 mg (28) Tab; Take 1 tablet (4 mg total) by mouth once daily.  Dispense: 84 tablet; Refill: 3        COUNSELING:  The patient was counseled today on:  -A.C.S. Pap and pelvic exam guidelines, recomendations for yearly mammogram, monthly self breast exams and to follow up with her PCP for other health maintenance.    FOLLOW-UP  annually for WWE.

## 2025-01-27 ENCOUNTER — PATIENT MESSAGE (OUTPATIENT)
Dept: OBSTETRICS AND GYNECOLOGY | Facility: CLINIC | Age: 35
End: 2025-01-27
Payer: MEDICAID

## 2025-01-28 DIAGNOSIS — R10.2 PELVIC PAIN: Primary | ICD-10-CM

## 2025-02-19 ENCOUNTER — TELEPHONE (OUTPATIENT)
Dept: OBSTETRICS AND GYNECOLOGY | Facility: CLINIC | Age: 35
End: 2025-02-19
Payer: MEDICAID

## 2025-05-08 ENCOUNTER — TELEPHONE (OUTPATIENT)
Dept: OBSTETRICS AND GYNECOLOGY | Facility: CLINIC | Age: 35
End: 2025-05-08
Payer: MEDICAID

## 2025-05-08 DIAGNOSIS — Z80.3 FAMILY HISTORY OF BREAST CANCER: Primary | ICD-10-CM

## 2025-05-08 NOTE — TELEPHONE ENCOUNTER
Spoke with pt. Notified pt that a referral to the breast center will be placed. Pt verbalized understanding.

## 2025-05-08 NOTE — TELEPHONE ENCOUNTER
----- Message from Renate sent at 5/8/2025  2:50 PM CDT -----  Regarding: Requesting Orders  Contact: Jennifer  CONSULT/ADVISORYName of Caller: Yomi Preference: [phone] (home) Nature of Call: Pt mother has recently found that she has breast cancer and the provider suggested the pt be scheduled in for a mammo. Would like for orders to be put in for further evaluation. Pt would like to be contacted when orders are put in order to schedule appt.

## 2025-05-12 ENCOUNTER — TELEPHONE (OUTPATIENT)
Dept: INTERNAL MEDICINE | Facility: CLINIC | Age: 35
End: 2025-05-12
Payer: MEDICAID

## 2025-05-12 NOTE — TELEPHONE ENCOUNTER
----- Message from Renate sent at 5/8/2025  2:50 PM CDT -----  Regarding: Requesting Orders  Contact: Jennifer  CONSULT/ADVISORYName of Caller: Yomi Preference: [phone] (home) Nature of Call: Pt mother has recently found that she has breast cancer and the provider suggested the pt be scheduled in for a mammo. Would like for orders to be put in for further evaluation. Pt would like to be contacted when orders are put in order to schedule appt.

## 2025-05-12 NOTE — TELEPHONE ENCOUNTER
Called and spoke to pt. Informed pt that she would have to come in to est care with Dr. Kramer and that the early mammo can be discussed then. Pt stated she has different insurance and will call them to verify if appt with Dr. Kramer will be covered. Pt will call us back to schedule appt.

## (undated) DEVICE — SUT MCRYL PLUS 4-0 PS2 27IN

## (undated) DEVICE — NDL INSUFFLATION VERRES 120MM

## (undated) DEVICE — DRAIN WND 15FRX3/16X4.7MM TRCR

## (undated) DEVICE — TROCAR KII FIOS 5MM X 100MM

## (undated) DEVICE — SOL POVIDONE SCRUB IODINE 4 OZ

## (undated) DEVICE — APPLICATOR CHLORAPREP ORN 26ML

## (undated) DEVICE — ADHESIVE DERMABOND ADVANCED

## (undated) DEVICE — IRRIGATOR ENDOSCOPY DISP.

## (undated) DEVICE — SET EXT CLEARLINK LL 44IN

## (undated) DEVICE — TROCAR KII FIOS 11MM X 100MM

## (undated) DEVICE — SOL NORMAL USPCA 0.9%

## (undated) DEVICE — PENCIL ELECTROSURG HOLST W/BLD

## (undated) DEVICE — STOPCOCK DISCOFIX 3 WAY

## (undated) DEVICE — SYR B-D DISP CONTROL 10CC100/C

## (undated) DEVICE — APPLIER CLIP ENDO LIGAMAX 5MM

## (undated) DEVICE — SOL IRR SOD CHL .9% POUR

## (undated) DEVICE — SYS SEE SHARP SCP ANTIFG LNG

## (undated) DEVICE — COLLECTOR SPECIMEN ANAEROBIC

## (undated) DEVICE — STRIP MEDI WND CLSR 1/2X4IN

## (undated) DEVICE — NDL HYPO REG 25G X 1 1/2

## (undated) DEVICE — VIDEO RENTAL SERVICE

## (undated) DEVICE — GLOVE BIOGEL SKINSENSE PI 8.0

## (undated) DEVICE — ELECTRODE REM PLYHSV RETURN 9

## (undated) DEVICE — SWAB CULTURETTE II DUAL

## (undated) DEVICE — STRIP STERI REIN CLSR 1/2X2IN

## (undated) DEVICE — KIT WING PAD POSITIONING

## (undated) DEVICE — SUT VICRYL 0 27 CT-2

## (undated) DEVICE — Device

## (undated) DEVICE — GLOVE BIOGEL SKINSENSE PI 6.5

## (undated) DEVICE — GLOVE BIOGEL SKINSENSE PI 6.0